# Patient Record
Sex: FEMALE | Race: WHITE | NOT HISPANIC OR LATINO | Employment: FULL TIME | URBAN - METROPOLITAN AREA
[De-identification: names, ages, dates, MRNs, and addresses within clinical notes are randomized per-mention and may not be internally consistent; named-entity substitution may affect disease eponyms.]

---

## 2024-05-17 ENCOUNTER — HOSPITAL ENCOUNTER (EMERGENCY)
Facility: HOSPITAL | Age: 34
Discharge: HOME | End: 2024-05-17
Payer: COMMERCIAL

## 2024-05-17 VITALS
SYSTOLIC BLOOD PRESSURE: 141 MMHG | HEIGHT: 64 IN | RESPIRATION RATE: 16 BRPM | WEIGHT: 253 LBS | BODY MASS INDEX: 43.19 KG/M2 | DIASTOLIC BLOOD PRESSURE: 85 MMHG | HEART RATE: 84 BPM | TEMPERATURE: 97.7 F | OXYGEN SATURATION: 99 %

## 2024-05-17 DIAGNOSIS — M54.31 SCIATICA OF RIGHT SIDE: Primary | ICD-10-CM

## 2024-05-17 PROCEDURE — 2500000005 HC RX 250 GENERAL PHARMACY W/O HCPCS: Performed by: PHYSICIAN ASSISTANT

## 2024-05-17 PROCEDURE — 2500000004 HC RX 250 GENERAL PHARMACY W/ HCPCS (ALT 636 FOR OP/ED): Performed by: PHYSICIAN ASSISTANT

## 2024-05-17 PROCEDURE — 96372 THER/PROPH/DIAG INJ SC/IM: CPT | Performed by: PHYSICIAN ASSISTANT

## 2024-05-17 PROCEDURE — 2500000001 HC RX 250 WO HCPCS SELF ADMINISTERED DRUGS (ALT 637 FOR MEDICARE OP): Performed by: PHYSICIAN ASSISTANT

## 2024-05-17 PROCEDURE — 99283 EMERGENCY DEPT VISIT LOW MDM: CPT

## 2024-05-17 RX ORDER — CYCLOBENZAPRINE HCL 10 MG
10 TABLET ORAL 3 TIMES DAILY PRN
Qty: 15 TABLET | Refills: 0 | Status: SHIPPED | OUTPATIENT
Start: 2024-05-17

## 2024-05-17 RX ORDER — LIDOCAINE 560 MG/1
1 PATCH PERCUTANEOUS; TOPICAL; TRANSDERMAL DAILY
Qty: 6 PATCH | Refills: 0 | Status: SHIPPED | OUTPATIENT
Start: 2024-05-17

## 2024-05-17 RX ORDER — HYDROCODONE BITARTRATE AND ACETAMINOPHEN 5; 325 MG/1; MG/1
1 TABLET ORAL ONCE
Status: COMPLETED | OUTPATIENT
Start: 2024-05-17 | End: 2024-05-17

## 2024-05-17 RX ORDER — LIDOCAINE 560 MG/1
1 PATCH PERCUTANEOUS; TOPICAL; TRANSDERMAL DAILY
Qty: 1 PATCH | Refills: 0 | Status: DISCONTINUED | OUTPATIENT
Start: 2024-05-17 | End: 2024-05-17 | Stop reason: HOSPADM

## 2024-05-17 RX ORDER — HYDROCODONE BITARTRATE AND ACETAMINOPHEN 5; 325 MG/1; MG/1
1 TABLET ORAL EVERY 6 HOURS PRN
Qty: 12 TABLET | Refills: 0 | Status: SHIPPED | OUTPATIENT
Start: 2024-05-17 | End: 2024-05-20

## 2024-05-17 RX ORDER — IBUPROFEN 600 MG/1
600 TABLET ORAL EVERY 6 HOURS PRN
Qty: 28 TABLET | Refills: 0 | Status: SHIPPED | OUTPATIENT
Start: 2024-05-17 | End: 2024-05-24

## 2024-05-17 RX ORDER — ORPHENADRINE CITRATE 30 MG/ML
60 INJECTION INTRAMUSCULAR; INTRAVENOUS ONCE
Status: COMPLETED | OUTPATIENT
Start: 2024-05-17 | End: 2024-05-17

## 2024-05-17 RX ORDER — KETOROLAC TROMETHAMINE 30 MG/ML
30 INJECTION, SOLUTION INTRAMUSCULAR; INTRAVENOUS ONCE
Status: COMPLETED | OUTPATIENT
Start: 2024-05-17 | End: 2024-05-17

## 2024-05-17 RX ADMIN — KETOROLAC TROMETHAMINE 30 MG: 30 INJECTION, SOLUTION INTRAMUSCULAR at 11:27

## 2024-05-17 RX ADMIN — LIDOCAINE 1 PATCH: 4 PATCH TOPICAL at 11:27

## 2024-05-17 RX ADMIN — HYDROCODONE BITARTRATE AND ACETAMINOPHEN 1 TABLET: 5; 325 TABLET ORAL at 11:26

## 2024-05-17 RX ADMIN — ORPHENADRINE CITRATE 60 MG: 60 INJECTION INTRAMUSCULAR; INTRAVENOUS at 11:27

## 2024-05-17 ASSESSMENT — PAIN DESCRIPTION - LOCATION: LOCATION: BACK

## 2024-05-17 ASSESSMENT — COLUMBIA-SUICIDE SEVERITY RATING SCALE - C-SSRS
1. IN THE PAST MONTH, HAVE YOU WISHED YOU WERE DEAD OR WISHED YOU COULD GO TO SLEEP AND NOT WAKE UP?: NO
2. HAVE YOU ACTUALLY HAD ANY THOUGHTS OF KILLING YOURSELF?: NO
6. HAVE YOU EVER DONE ANYTHING, STARTED TO DO ANYTHING, OR PREPARED TO DO ANYTHING TO END YOUR LIFE?: NO

## 2024-05-17 ASSESSMENT — PAIN DESCRIPTION - PAIN TYPE: TYPE: ACUTE PAIN

## 2024-05-17 ASSESSMENT — PAIN - FUNCTIONAL ASSESSMENT
PAIN_FUNCTIONAL_ASSESSMENT: 0-10
PAIN_FUNCTIONAL_ASSESSMENT: 0-10

## 2024-05-17 ASSESSMENT — PAIN SCALES - GENERAL
PAINLEVEL_OUTOF10: 8
PAINLEVEL_OUTOF10: 10 - WORST POSSIBLE PAIN

## 2024-05-18 NOTE — ED PROVIDER NOTES
HPI   Chief Complaint   Patient presents with    Back Pain       Pleasant 33-year-old female complains of pain in her back on the right SI region rating down her right leg history of low back pain and sciatica in the past states that exacerbates from time to time.  No specific injury.  No bowel bladder incontinence no saddle anesthesia or paresthesia.  Nothing makes her better worse with certain movements and walking.                          [place] Coma Scale Score: 15                     Patient History   History reviewed. No pertinent past medical history.  History reviewed. No pertinent surgical history.  No family history on file.  Social History     Tobacco Use    Smoking status: Not on file    Smokeless tobacco: Not on file   Substance Use Topics    Alcohol use: Not on file    Drug use: Not on file       Physical Exam   ED Triage Vitals   Temperature Heart Rate Respirations BP   05/17/24 1041 05/17/24 1041 05/17/24 1147 05/17/24 1041   36.5 °C (97.7 °F) 89 16 141/85      Pulse Ox Temp Source Heart Rate Source Patient Position   05/17/24 1041 05/17/24 1041 05/17/24 1041 05/17/24 1041   97 % Temporal Monitor Sitting      BP Location FiO2 (%)     05/17/24 1041 --     Left arm        Physical Exam  Vitals reviewed.   Constitutional:       General: She is not in acute distress.     Appearance: Normal appearance. She is normal weight. She is not ill-appearing, toxic-appearing or diaphoretic.   HENT:      Head: Normocephalic and atraumatic.      Right Ear: External ear normal.      Left Ear: External ear normal.      Nose: Nose normal.   Eyes:      Extraocular Movements: Extraocular movements intact.      Conjunctiva/sclera: Conjunctivae normal.      Pupils: Pupils are equal, round, and reactive to light.   Cardiovascular:      Rate and Rhythm: Normal rate.   Pulmonary:      Effort: Pulmonary effort is normal. No respiratory distress.      Breath sounds: No stridor.   Abdominal:      General: There is no  distension.   Musculoskeletal:         General: Tenderness present. No swelling or deformity.      Cervical back: Normal range of motion.      Comments: Tender in the right SI joint with positive straight leg raise tenderness down her right leg to the knee.  Gait limited due to pain.  No neurologic deficits.   Skin:     Capillary Refill: Capillary refill takes less than 2 seconds.      Findings: No rash.   Neurological:      General: No focal deficit present.      Mental Status: She is alert and oriented to person, place, and time. Mental status is at baseline.   Psychiatric:         Mood and Affect: Mood normal.         Behavior: Behavior normal.         Thought Content: Thought content normal.         Judgment: Judgment normal.         ED Course & MDM   Diagnoses as of 05/17/24 2015   Sciatica of right side       Medical Decision Making  Differential includes sciatica, lumbar radiculopathy, aortic dissection, cauda equina syndrome    No red flags of back pain we will treat her pain and encourage close follow-up with primary care referred to a new 1 since she is switched insurances.        Procedure  Procedures     Brendan Sabillon PA-C  05/17/24 2017

## 2024-05-31 ENCOUNTER — APPOINTMENT (OUTPATIENT)
Dept: PRIMARY CARE | Facility: CLINIC | Age: 34
End: 2024-05-31
Payer: COMMERCIAL

## 2024-10-30 PROCEDURE — RXMED WILLOW AMBULATORY MEDICATION CHARGE

## 2024-10-31 ENCOUNTER — PHARMACY VISIT (OUTPATIENT)
Dept: PHARMACY | Facility: CLINIC | Age: 34
End: 2024-10-31
Payer: COMMERCIAL

## 2024-12-26 PROCEDURE — RXMED WILLOW AMBULATORY MEDICATION CHARGE

## 2025-01-03 PROCEDURE — RXMED WILLOW AMBULATORY MEDICATION CHARGE

## 2025-01-04 ENCOUNTER — PHARMACY VISIT (OUTPATIENT)
Dept: PHARMACY | Facility: CLINIC | Age: 35
End: 2025-01-04
Payer: COMMERCIAL

## 2025-01-28 PROCEDURE — RXMED WILLOW AMBULATORY MEDICATION CHARGE

## 2025-01-30 PROCEDURE — RXMED WILLOW AMBULATORY MEDICATION CHARGE

## 2025-01-31 ENCOUNTER — PHARMACY VISIT (OUTPATIENT)
Dept: PHARMACY | Facility: CLINIC | Age: 35
End: 2025-01-31
Payer: COMMERCIAL

## 2025-01-31 ENCOUNTER — HOSPITAL ENCOUNTER (EMERGENCY)
Facility: HOSPITAL | Age: 35
Discharge: HOME | End: 2025-01-31
Attending: EMERGENCY MEDICINE
Payer: COMMERCIAL

## 2025-01-31 ENCOUNTER — APPOINTMENT (OUTPATIENT)
Dept: RADIOLOGY | Facility: HOSPITAL | Age: 35
End: 2025-01-31
Payer: COMMERCIAL

## 2025-01-31 VITALS
HEART RATE: 89 BPM | BODY MASS INDEX: 30.73 KG/M2 | DIASTOLIC BLOOD PRESSURE: 78 MMHG | RESPIRATION RATE: 18 BRPM | OXYGEN SATURATION: 98 % | WEIGHT: 180 LBS | TEMPERATURE: 97.6 F | SYSTOLIC BLOOD PRESSURE: 122 MMHG | HEIGHT: 64 IN

## 2025-01-31 DIAGNOSIS — M79.604 RIGHT LEG PAIN: Primary | ICD-10-CM

## 2025-01-31 DIAGNOSIS — M54.31 SCIATICA OF RIGHT SIDE: ICD-10-CM

## 2025-01-31 PROCEDURE — 99284 EMERGENCY DEPT VISIT MOD MDM: CPT | Performed by: EMERGENCY MEDICINE

## 2025-01-31 PROCEDURE — 73552 X-RAY EXAM OF FEMUR 2/>: CPT | Mod: RT

## 2025-01-31 PROCEDURE — 2500000004 HC RX 250 GENERAL PHARMACY W/ HCPCS (ALT 636 FOR OP/ED): Performed by: SURGERY

## 2025-01-31 PROCEDURE — 73590 X-RAY EXAM OF LOWER LEG: CPT | Mod: RIGHT SIDE | Performed by: RADIOLOGY

## 2025-01-31 PROCEDURE — 2500000001 HC RX 250 WO HCPCS SELF ADMINISTERED DRUGS (ALT 637 FOR MEDICARE OP): Performed by: SURGERY

## 2025-01-31 PROCEDURE — 96372 THER/PROPH/DIAG INJ SC/IM: CPT | Performed by: SURGERY

## 2025-01-31 PROCEDURE — 73590 X-RAY EXAM OF LOWER LEG: CPT | Mod: RT

## 2025-01-31 PROCEDURE — 73552 X-RAY EXAM OF FEMUR 2/>: CPT | Mod: RIGHT SIDE | Performed by: RADIOLOGY

## 2025-01-31 PROCEDURE — 2500000004 HC RX 250 GENERAL PHARMACY W/ HCPCS (ALT 636 FOR OP/ED): Mod: JZ | Performed by: EMERGENCY MEDICINE

## 2025-01-31 PROCEDURE — 96372 THER/PROPH/DIAG INJ SC/IM: CPT | Performed by: EMERGENCY MEDICINE

## 2025-01-31 RX ORDER — HYDROMORPHONE HYDROCHLORIDE 1 MG/ML
1 INJECTION, SOLUTION INTRAMUSCULAR; INTRAVENOUS; SUBCUTANEOUS ONCE
Status: COMPLETED | OUTPATIENT
Start: 2025-01-31 | End: 2025-01-31

## 2025-01-31 RX ORDER — CYCLOBENZAPRINE HCL 10 MG
10 TABLET ORAL ONCE
Status: COMPLETED | OUTPATIENT
Start: 2025-01-31 | End: 2025-01-31

## 2025-01-31 RX ORDER — KETOROLAC TROMETHAMINE 30 MG/ML
30 INJECTION, SOLUTION INTRAMUSCULAR; INTRAVENOUS ONCE
Status: COMPLETED | OUTPATIENT
Start: 2025-01-31 | End: 2025-01-31

## 2025-01-31 RX ORDER — ACETAMINOPHEN 325 MG/1
650 TABLET ORAL ONCE
Status: COMPLETED | OUTPATIENT
Start: 2025-01-31 | End: 2025-01-31

## 2025-01-31 RX ORDER — METHYLPREDNISOLONE 4 MG/1
TABLET ORAL
Qty: 21 TABLET | Refills: 0 | Status: SHIPPED | OUTPATIENT
Start: 2025-01-31 | End: 2025-02-06

## 2025-01-31 RX ORDER — GABAPENTIN 300 MG/1
300 CAPSULE ORAL ONCE
Status: COMPLETED | OUTPATIENT
Start: 2025-01-31 | End: 2025-01-31

## 2025-01-31 RX ORDER — PREDNISONE 20 MG/1
60 TABLET ORAL ONCE
Status: COMPLETED | OUTPATIENT
Start: 2025-01-31 | End: 2025-01-31

## 2025-01-31 RX ADMIN — KETOROLAC TROMETHAMINE 30 MG: 30 INJECTION, SOLUTION INTRAMUSCULAR at 04:17

## 2025-01-31 RX ADMIN — PREDNISONE 60 MG: 20 TABLET ORAL at 04:17

## 2025-01-31 RX ADMIN — ACETAMINOPHEN 650 MG: 325 TABLET, FILM COATED ORAL at 06:16

## 2025-01-31 RX ADMIN — GABAPENTIN 300 MG: 300 CAPSULE ORAL at 06:16

## 2025-01-31 RX ADMIN — CYCLOBENZAPRINE 10 MG: 10 TABLET, FILM COATED ORAL at 04:17

## 2025-01-31 RX ADMIN — HYDROMORPHONE HYDROCHLORIDE 1 MG: 1 INJECTION, SOLUTION INTRAMUSCULAR; INTRAVENOUS; SUBCUTANEOUS at 06:32

## 2025-01-31 ASSESSMENT — PAIN SCALES - GENERAL
PAINLEVEL_OUTOF10: 8
PAINLEVEL_OUTOF10: 8
PAINLEVEL_OUTOF10: 7
PAINLEVEL_OUTOF10: 8
PAINLEVEL_OUTOF10: 8

## 2025-01-31 ASSESSMENT — PAIN DESCRIPTION - PAIN TYPE
TYPE: ACUTE PAIN
TYPE: ACUTE PAIN

## 2025-01-31 ASSESSMENT — PAIN DESCRIPTION - ORIENTATION
ORIENTATION: RIGHT
ORIENTATION: RIGHT

## 2025-01-31 ASSESSMENT — PAIN DESCRIPTION - LOCATION
LOCATION: LEG
LOCATION: LEG

## 2025-01-31 ASSESSMENT — COLUMBIA-SUICIDE SEVERITY RATING SCALE - C-SSRS
2. HAVE YOU ACTUALLY HAD ANY THOUGHTS OF KILLING YOURSELF?: NO
1. IN THE PAST MONTH, HAVE YOU WISHED YOU WERE DEAD OR WISHED YOU COULD GO TO SLEEP AND NOT WAKE UP?: NO
6. HAVE YOU EVER DONE ANYTHING, STARTED TO DO ANYTHING, OR PREPARED TO DO ANYTHING TO END YOUR LIFE?: NO

## 2025-01-31 ASSESSMENT — PAIN - FUNCTIONAL ASSESSMENT
PAIN_FUNCTIONAL_ASSESSMENT: 0-10
PAIN_FUNCTIONAL_ASSESSMENT: 0-10

## 2025-01-31 NOTE — Clinical Note
Sofia Foreman was seen and treated in our emergency department on 1/31/2025.  She may return to work on 02/03/2025.       If you have any questions or concerns, please don't hesitate to call.      David Briceno PA-C

## 2025-01-31 NOTE — PROGRESS NOTES
Emergency Medicine Transition of Care Note.    I received Sofia Foreman in signout from Dr. Roy.  Please see the previous ED provider note for all HPI, PE and MDM up to the time of signout at 6:00. This is in addition to the primary record.    In brief Sofia Foreman is an 34 y.o. female presenting for   Chief Complaint   Patient presents with    Leg Pain     At the time of signout we were awaiting: Response to pain meds.  Patient asked to be reevaluated by somebody else because she was concerned that her pain was not controlled.  I reevaluated the patient and she does have numbness along the L5 distribution so I am concerned for a sciatica with nerve impingement.  She has already received prednisone as well as Flexeril and Tylenol.  I added an additional dose of her 300 mg gabapentin I gave her 1 mg of IV Dilaudid.  Patient's pain is much improved now.  I communicated with Dr. Hein and Dr. Hein will get her scheduled for a pain injection is soon as possible.  Patient already has a prescription for prednisone waiting for her that was prescribed yesterday.  I instructed her to take this as well as increase her gabapentin to 3 times a day and add an NSAID every 12 hours.  Patient is frustrated that I am not prescribing her an opiate.  Patient is on numerous sedating medications including benzos and opiates have not been shown to control back pain, in addition there are multiple contraindications with mixing benzos and opiates so I am not prescribing her an opiate.  Patient is requesting to speak to the [facility] and I have provided her with patient advocate's number.    Diagnoses as of 01/31/25 0959   Right leg pain   Sciatica of right side       Medical Decision Making  Risk  Risk Details: Discussed with Dr. Hein again.  Our plan is to see if patient has relief over the weekend with prednisone and increasing her Neurontin.  If she has intolerable pain on Monday she can be admitted to [place]  through the [facility] emergency department for a pain procedure.        Final diagnoses:   [M79.604] Right leg pain   [M54.31] Sciatica of right side           Procedure  Procedures    Diana Perdomo MD

## 2025-01-31 NOTE — ED PROVIDER NOTES
Chief Complaint   Patient presents with    Leg Pain     HPI:   Sofia Foreman is an 34 y.o. female with a history of LEONARDO, HLD, obesity presenting to the ED for chief complaint of right leg pain.  She explains her symptoms began yesterday and she experienced pain in her right gluteal muscle that has been progressively worsening.  Explains that the pain radiates down her entire leg and feels like an dull aching spasm.  She explains she has numbness on the lateral aspect of her right leg and bottom of her foot.  She did call her primary care doctor today who sent her in a prescription for baclofen and steroids which she did not have time to  today.  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  Patient has increased pain with ambulation and certain movements.  She is taken ibuprofen with no relief.  Denies fever chills or sweats.  No abdominal pain NVD.  No chest pain or shortness of breath.      Allergies   Allergen Reactions    Penicillins Anaphylaxis    Mushroom Rash   :  No past medical history on file.  No past surgical history on file.  No family history on file.     Physical Exam  Vitals and nursing note reviewed.   Constitutional:       General: She is not in acute distress.     Appearance: She is well-developed.   HENT:      Head: Normocephalic and atraumatic.      Right Ear: Tympanic membrane normal.      Left Ear: Tympanic membrane normal.      Nose: Nose normal.      Mouth/Throat:      Mouth: Mucous membranes are moist.      Pharynx: Oropharynx is clear.   Eyes:      Extraocular Movements: Extraocular movements intact.      Conjunctiva/sclera: Conjunctivae normal.      Pupils: Pupils are equal, round, and reactive to light.   Cardiovascular:      Rate and Rhythm: Normal rate and regular rhythm.      Heart sounds: No murmur heard.  Pulmonary:      Effort: Pulmonary effort is normal. No respiratory distress.      Breath sounds: Normal breath sounds.   Abdominal:       Palpations: Abdomen is soft.      Tenderness: There is no abdominal tenderness.   Musculoskeletal:         General: No swelling, tenderness, deformity or signs of injury. Normal range of motion.      Cervical back: Neck supple.      Right lower leg: No edema.      Left lower leg: No edema.   Skin:     General: Skin is warm and dry.      Capillary Refill: Capillary refill takes less than 2 seconds.   Neurological:      General: No focal deficit present.      Mental Status: She is alert and oriented to person, place, and time.      Motor: Motor function is intact. No atrophy.   Psychiatric:         Mood and Affect: Mood normal.        VS: As documented in the triage note and EMR flowsheet from this visit were reviewed.    Medical Decision Making: This is a 34-year-old female presenting to the ED for complaint of right leg pain.  Her pain is aching spasm.  Her pain does not follow any specific distribution or character.  My exam shows full range of motion of the joints in the lower extremity.  Her pain is not reproducible on exam.  She is able to ambulate with an antalgic gait.  Her sensation was preserved on the right lower extremity.  She did have decreased sensation on the bottom of the foot.  She had strength in flexion and extension in all joints.  She had no muscle wasting.  Good distal pulses good cap refill.  Full range of motion at the hip knee and ankle no suspicion for cord compression or septic joint.  She does take hormones however there is no swelling erythema negative Homans' sign so duplex ultrasound was not ordered.  Low suspicion of DVT.  She was treated in the ED with 60 mg of prednisone, 30 mg of Toradol, 300 mg of gabapentin, 10 mg of Flexeril and 650 mg of Tylenol.  She was given a referral to pain medicine.  Patient was discharged for home-going management.  Patient was very angry upon discharge requesting admission and requesting MRI be performed.  Discussed with her in detail her symptoms do  not fit criteria for emergency MRI.  She was able to walk and did drive to the ED so narcotics were not administered.  She was signed out to incoming Dr. Perdomo for discharge after discussion with the nursing supervisor and ross.  Diagnoses as of 01/31/25 0559   Right leg pain   Sciatica of right side     Counseling: Spoke with the patient and discussed today´s findings, in addition to providing specific details for the plan of care and expected course.  Patient was given the opportunity to ask questions.    Discussed return precautions and importance of follow-up.  Advised to follow-up with pain medicine.  I specifically advised to return to the ED for changing or worsening symptoms, new symptoms, complaint specific precautions, and precautions listed on the discharge paperwork.  Educated on the common potential side effects of medications prescribed.    I advised the patient that the emergency evaluation and treatment provided today doesn't end their need for medical care. It is very important that they follow-up with their primary care provider or other specialist as instructed.    The plan of care was mutually agreed upon with the patient. The patient and/or family were given the opportunity to ask questions. All questions asked today in the ED were answered to the best of my ability with today's information.    This report was transcribed using voice recognition software.  Every effort was made to ensure accuracy, however, inadvertently computerized transcription errors may be present.       David Briceno PA-C  01/31/25 2020

## 2025-01-31 NOTE — DISCHARGE INSTRUCTIONS
Dr. Hein's office will follow up with you to schedule a pain injection.  To manage your pain until you see her:   Increase your gabapentin to 300 mg three times per day   Continue the steroid pack- start this tomorrow because you already had your first dose in the ED   Add aleve 1 pill every 12 hours   Continue your regular medications including clonazepam, topamax and trazodone    PT [facility]:  [phone]

## 2025-01-31 NOTE — ED TRIAGE NOTES
Pt from private vehicle c/c of Right leg pain and numbness starting mid shin to foot. Denies any injury.     Pt A&Ox3, pt alert calm cooperative with care. Pt resp even and unlabored.     PMH: n/a

## 2025-02-06 ENCOUNTER — HOSPITAL ENCOUNTER (OUTPATIENT)
Dept: RADIOLOGY | Facility: CLINIC | Age: 35
Discharge: HOME | End: 2025-02-06
Payer: COMMERCIAL

## 2025-02-06 DIAGNOSIS — M54.50 LOW BACK PAIN, UNSPECIFIED: ICD-10-CM

## 2025-02-06 DIAGNOSIS — R53.1 WEAKNESS: ICD-10-CM

## 2025-02-06 DIAGNOSIS — R20.2 PARESTHESIA OF SKIN: ICD-10-CM

## 2025-02-06 PROCEDURE — 72148 MRI LUMBAR SPINE W/O DYE: CPT

## 2025-02-06 PROCEDURE — 72141 MRI NECK SPINE W/O DYE: CPT

## 2025-02-12 ENCOUNTER — APPOINTMENT (OUTPATIENT)
Facility: HOSPITAL | Age: 35
End: 2025-02-12
Payer: COMMERCIAL

## 2025-02-18 ENCOUNTER — APPOINTMENT (OUTPATIENT)
Dept: PAIN MEDICINE | Facility: HOSPITAL | Age: 35
End: 2025-02-18
Payer: COMMERCIAL

## 2025-02-24 ENCOUNTER — APPOINTMENT (OUTPATIENT)
Dept: ORTHOPEDIC SURGERY | Facility: CLINIC | Age: 35
End: 2025-02-24
Payer: COMMERCIAL

## 2025-02-24 ENCOUNTER — HOSPITAL ENCOUNTER (OUTPATIENT)
Dept: RADIOLOGY | Facility: CLINIC | Age: 35
Discharge: HOME | End: 2025-02-24
Payer: COMMERCIAL

## 2025-02-24 DIAGNOSIS — M54.31 SCIATICA OF RIGHT SIDE: ICD-10-CM

## 2025-02-24 PROBLEM — M51.26 OTHER INTERVERTEBRAL DISC DISPLACEMENT, LUMBAR REGION: Status: ACTIVE | Noted: 2025-02-24

## 2025-02-24 PROCEDURE — 72100 X-RAY EXAM L-S SPINE 2/3 VWS: CPT

## 2025-02-24 PROCEDURE — 99204 OFFICE O/P NEW MOD 45 MIN: CPT | Performed by: ORTHOPAEDIC SURGERY

## 2025-02-24 PROCEDURE — 72100 X-RAY EXAM L-S SPINE 2/3 VWS: CPT | Performed by: RADIOLOGY

## 2025-02-24 ASSESSMENT — PAIN - FUNCTIONAL ASSESSMENT: PAIN_FUNCTIONAL_ASSESSMENT: 0-10

## 2025-02-24 ASSESSMENT — PAIN SCALES - GENERAL: PAINLEVEL_OUTOF10: 8

## 2025-02-24 NOTE — LETTER
February 24, 2025     David Briceno PA-C  [address]  [facility] [address]    Patient: Sofia Foreman   YOB: 1990   Date of Visit: 2/24/2025       Dear Dr. David Briceno PA-C:    Thank you for referring Sofia Foreman to me for evaluation. Below are my notes for this consultation.  If you have questions, please do not hesitate to call me. I look forward to following your patient along with you.       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    8 weeks ago, she developed the abrupt onset of severe right sciatica with pain and weakness.    She has had periodic episodes of low back in the past.  No prior radicular symptoms.    She was seen in the [facility] emergency department.  Treated with analgesics and steroids.  She has been using nonsteroidals.    She is having great difficulty standing and walking.  It has been very difficult for her to work.    No left leg symptoms.    She does have a strong family history of congenital stenosis with siblings and parent having prior lumbar surgery.    She has had and elective 80 pound weight loss over the last year with medication and exercise.    Family, social, and medical histories are obtained and reviewed.    30-point, patient-recorded Review of Systems is personally obtained and reviewed. Inclusive is no history of weight loss, change in appetite, recent change in activity level, change in bowel or bladder habits, fevers, chills, malaise, or night pain.    She is here with her mother    She does not smoke.    On exam she is a pleasant young woman in significant distress secondary to right leg pain and weakness.  She has a very slow antalgic gait secondary to right leg pain.    Painless motion both hips.    Her strength is intact in the left lower  extremity.    On the right she has weakness in knee extension ankle dorsiflexion both 3/5 great toe dorsiflexion 2/5.  No hyperreflexia.    Flexion-extension views today show maintenance of alignment without instability.  Short pedicles consistent with congenital stenosis.    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  Her symptoms have been present for 8 weeks with no improvement.    Given the duration and persistence of her symptoms along with the objective weakness, surgery is indicated in the form of a partial discectomy.  I suspect this can be addressed as a microdiscectomy.  With the congenital narrowing, there would be the potential need for a laminectomy.    We have discussed the risks and benefits and expectations of surgery.    She would like to proceed.    Tentatively, I would plan her to be off of work for 8 weeks following surgery, given the magnitude of her pain and the magnitude of her preoperative weakness.    Surgery would be and L4-5 microdiscectomy.    ** Dictated with voice recognition software and not immediately reviewed for errors in grammar and/or spelling **

## 2025-02-24 NOTE — PROGRESS NOTES
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    8 weeks ago, she developed the abrupt onset of severe right sciatica with pain and weakness.    She has had periodic episodes of low back in the past.  No prior radicular symptoms.    She was seen in the [facility] emergency department.  Treated with analgesics and steroids.  She has been using nonsteroidals.    She is having great difficulty standing and walking.  It has been very difficult for her to work.    No left leg symptoms.    She does have a strong family history of congenital stenosis with siblings and parent having prior lumbar surgery.    She has had and elective 80 pound weight loss over the last year with medication and exercise.    Family, social, and medical histories are obtained and reviewed.    30-point, patient-recorded Review of Systems is personally obtained and reviewed. Inclusive is no history of weight loss, change in appetite, recent change in activity level, change in bowel or bladder habits, fevers, chills, malaise, or night pain.    She is here with her mother    She does not smoke.    On exam she is a pleasant young woman in significant distress secondary to right leg pain and weakness.  She has a very slow antalgic gait secondary to right leg pain.    Painless motion both hips.    Her strength is intact in the left lower extremity.    On the right she has weakness in knee extension ankle dorsiflexion both 3/5 great toe dorsiflexion 2/5.  No hyperreflexia.    Flexion-extension views today show maintenance of alignment without instability.  Short pedicles consistent with congenital stenosis.    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  Her symptoms have been present for 8 weeks with no improvement.    Given the duration and  persistence of her symptoms along with the objective weakness, surgery is indicated in the form of a partial discectomy.  I suspect this can be addressed as a microdiscectomy.  With the congenital narrowing, there would be the potential need for a laminectomy.    We have discussed the risks and benefits and expectations of surgery.    She would like to proceed.    Tentatively, I would plan her to be off of work for 8 weeks following surgery, given the magnitude of her pain and the magnitude of her preoperative weakness.    Surgery would be and L4-5 microdiscectomy.    ** Dictated with voice recognition software and not immediately reviewed for errors in grammar and/or spelling **

## 2025-02-27 ENCOUNTER — TELEPHONE (OUTPATIENT)
Dept: ORTHOPEDIC SURGERY | Facility: HOSPITAL | Age: 35
End: 2025-02-27
Payer: COMMERCIAL

## 2025-02-27 ENCOUNTER — CLINICAL SUPPORT (OUTPATIENT)
Dept: PREADMISSION TESTING | Facility: HOSPITAL | Age: 35
End: 2025-02-27
Payer: COMMERCIAL

## 2025-02-27 DIAGNOSIS — M51.26 OTHER INTERVERTEBRAL DISC DISPLACEMENT, LUMBAR REGION: ICD-10-CM

## 2025-02-27 RX ORDER — OXYCODONE HYDROCHLORIDE 5 MG/1
5 TABLET ORAL EVERY 4 HOURS PRN
COMMUNITY
Start: 2025-02-08

## 2025-02-27 RX ORDER — OMEPRAZOLE 20 MG/1
20 TABLET, DELAYED RELEASE ORAL
COMMUNITY
Start: 2023-10-03

## 2025-02-27 RX ORDER — ACETAMINOPHEN, ASPIRIN (NSAID), AND CAFFEINE 250; 250; 65 MG/1; MG/1; MG/1
1 TABLET, FILM COATED ORAL EVERY 6 HOURS PRN
COMMUNITY

## 2025-02-27 NOTE — TELEPHONE ENCOUNTER
I spoke with Sofia to explain the [facility]) program and program criteria (including but not limited to: BMI less than 40, Hemoglobin A1C less than 8, and non-nicotine use). Patient meets ROWAN program criteria and wishes to proceed with the enhanced ROWAN benefit.     Temporary  Employee ROWAN ID card sent via [employer] mail and Epic MyChart- instructed to use this in place of standard insurance card and advised to present this when checking in for preadmission testing/ CPM appointment, on the date of surgery, and at the first post-operative appointment.     As part of the  Employee [facility] enhanced benefit, Sofia will receive a total of 5 home care visits. This will include one nursing visit, three physical therapy visits and one occupational therapy visit. Validated patient is within the [facility] network. Sofia has stairs in the home, approximately 10; she has a bathroom on each level of the house. Her  and mom will be assisting Sofia in the immediate post-operative period. She has been furniture walking. A front wheeled walker will be issued to her from our  DME department for safe ambulation.    Patient was provided my phone number as I will be a point of contact going forward. I will be in touch with Sofia as the surgery date approaches.  Jennifer Seay RN

## 2025-02-27 NOTE — CPM/PAT NURSE NOTE
CPM/PAT Nurse Note      Name: Sofia Foreman (Sofia Foreman)  /Age: 1990/34 y.o.       Past Medical History:   Diagnosis Date    Anemia     iron deficiency 13.4/41.5 H&H 2023    Anxiety     HL (hearing loss)     bilateral 35% loss, multiple tympanoplastys    Migraines        Past Surgical History:   Procedure Laterality Date    ANKLE FRACTURE SURGERY Left     HARDWARE REMOVAL Left 10/05/2022    TYMPANOPLASTY      multiple       Patient Sexual activity questions deferred to the physician.    No family history on file.    Allergies   Allergen Reactions    Penicillins Anaphylaxis    Mushroom Rash       Prior to Admission medications    Medication Sig Start Date End Date Taking? Authorizing Provider   omeprazole OTC (PriLOSEC OTC) 20 mg EC tablet Take 1 tablet (20 mg) by mouth once daily. 10/3/23  Yes Historical Provider, MD   oxyCODONE (Roxicodone) 5 mg immediate release tablet Take 1 tablet (5 mg) by mouth every 4 hours if needed. 25  Yes Historical Provider, MD   aspirin-acetaminophen-caffeine (Excedrin Extra Strength) 250-250-65 mg tablet Take 1 tablet by mouth every 6 hours if needed.    Historical Provider, MD   baclofen (Lioresal) 10 mg tablet Take 1 tablet (10 mg) by mouth 2 times a day. 25      buPROPion SR (Wellbutrin SR) 200 mg 12 hr tablet Take 1 tablet (200 mg) by mouth once daily. 1/3/25      clonazePAM (KlonoPIN) 1 mg tablet Take 1 tablet (1 mg) by mouth once daily as needed. 10/30/24      cyclobenzaprine (Flexeril) 10 mg tablet Take 1 tablet (10 mg) by mouth 3 times a day as needed for muscle spasms.  Patient not taking: Reported on 2025   Brendan Sabillon PA-C   gabapentin (Neurontin) 300 mg capsule Take 1 capsule (300 mg) by mouth once daily. 10/30/24      lidocaine 4 % patch Place 1 patch over 12 hours on the skin once daily. Remove & discard patch within 12 hours or as directed by MD. 24   Brendan Sabillon PA-C   meloxicam (Mobic) 15 mg tablet Take 1 tablet (15 mg)  by mouth once daily. 10/30/24      norethindrone-e.estradioL-iron (Loestrin 24 FE) 1 mg-20 mcg (24)/75 mg (4) tablet Take 1 tablet by mouth once daily. Take continuously (skip placebos) 1/3/25      sertraline (Zoloft) 100 mg tablet Take 1 and 1/2 tablets by mouth daily 1/3/25      topiramate (Topamax) 25 mg tablet Take 1 tablet by mouth every morning and 2 tablets by mouth every evening 1/3/25      traZODone (Desyrel) 100 mg tablet Take 1 tablet (100 mg) by mouth once daily at bedtime. 1/3/25           PAT ROS     DASI Risk Score      Flowsheet Row Questionnaire Series Submission from 2/26/2025 in [facility] with Generic Provider Chang   Can you take care of yourself (eat, dress, bathe, or use toilet)?  2.75  filed at 02/26/2025 0859   Can you walk indoors, such as around your house? 1.75  filed at 02/26/2025 0859   Can you walk a block or two on level ground?  2.75  filed at 02/26/2025 0859   Can you climb a flight of stairs or walk up a hill? 0  filed at 02/26/2025 0859   Can you run a short distance? 0  filed at 02/26/2025 0859   Can you do light work around the house like dusting or washing dishes? 2.7  filed at 02/26/2025 0859   Can you do moderate work around the house like vacuuming, sweeping floors or carrying groceries? 0  filed at 02/26/2025 0859   Can you do heavy work around the house like scrubbing floors or lifting and moving heavy furniture?  0  filed at 02/26/2025 0859   Can you do yard work like raking leaves, weeding or pushing a mower? 0  filed at 02/26/2025 0859   Can you have sexual relations? 0  filed at 02/26/2025 0859   Can you participate in moderate recreational activities like golf, bowling, dancing, doubles tennis or throwing a baseball or football? 0  filed at 02/26/2025 0859   Can you participate in strenous sports like swimming, singles tennis, football, basketball, or skiing? 0  filed at 02/26/2025 0859   DASI SCORE 9.95  filed at 02/26/2025 0859   METS Score (Will be calculated  only when all the questions are answered) 4  filed at 02/26/2025 0859          Caprini DVT Assessment    No data to display       Modified Frailty Index    No data to display       CHADS2 Stroke Risk  Current as of about an hour ago        N/A 3 to 100%: High Risk   2 to < 3%: Medium Risk   0 to < 2%: Low Risk     Last Change: N/A          This score determines the patient's risk of having a stroke if the patient has atrial fibrillation.        This score is not applicable to this patient. Components are not calculated.          Revised Cardiac Risk Index    No data to display       Apfel Simplified Score    No data to display       Risk Analysis Index Results This Encounter    No data found in the last 10 encounters.       Stop Bang Score      Flowsheet Row Questionnaire Series Submission from 2/26/2025 in [facility] with Generic Provider Chang   Do you snore loudly? 0  filed at 02/26/2025 0859   Do you often feel tired or fatigued after your sleep? 0  filed at 02/26/2025 0859   Has anyone ever observed you stop breathing in your sleep? 0  filed at 02/26/2025 0859   Do you have or are you being treated for high blood pressure? 0  filed at 02/26/2025 0859   Recent BMI (Calculated) 30.9  filed at 02/26/2025 0859   Is BMI greater than 35 kg/m2? 0=No  filed at 02/26/2025 0859   Age older than 50 years old? 0=No  filed at 02/26/2025 0859   Gender - Male 0=No  filed at 02/26/2025 0859          Prodigy: High Risk  Total Score: 3              Prodigy Previous Opioid Use Score           ARISCAT Score for Postoperative Pulmonary Complications    No data to display       Singh Perioperative Risk for Myocardial Infarction or Cardiac Arrest (COCO)    No data to display         Nurse Plan of Action:   RN screening call complete.  Reviewed allergies, medications and pharmacy, medical, surgical and social history with patient.  Chart updated.  Instructed patient to stop motrin prior to surgery.

## 2025-03-04 ENCOUNTER — CANCELED (OUTPATIENT)
Facility: CLINIC | Age: 35
End: 2025-03-04
Payer: COMMERCIAL

## 2025-03-04 ENCOUNTER — PRE-ADMISSION TESTING (OUTPATIENT)
Dept: PREADMISSION TESTING | Facility: HOSPITAL | Age: 35
End: 2025-03-04
Payer: COMMERCIAL

## 2025-03-04 ENCOUNTER — LAB (OUTPATIENT)
Dept: LAB | Facility: HOSPITAL | Age: 35
End: 2025-03-04
Payer: COMMERCIAL

## 2025-03-04 VITALS
TEMPERATURE: 97.7 F | HEIGHT: 64 IN | OXYGEN SATURATION: 96 % | DIASTOLIC BLOOD PRESSURE: 68 MMHG | SYSTOLIC BLOOD PRESSURE: 107 MMHG | BODY MASS INDEX: 30.49 KG/M2 | HEART RATE: 90 BPM | WEIGHT: 178.57 LBS | RESPIRATION RATE: 16 BRPM

## 2025-03-04 DIAGNOSIS — Z01.818 PREOP TESTING: Primary | ICD-10-CM

## 2025-03-04 DIAGNOSIS — Z01.818 ENCOUNTER FOR OTHER PREPROCEDURAL EXAMINATION: Primary | ICD-10-CM

## 2025-03-04 LAB
ANION GAP SERPL CALC-SCNC: 12 MMOL/L (ref 10–20)
APPEARANCE UR: ABNORMAL
APTT PPP: 30 SECONDS (ref 26–36)
BACTERIA #/AREA URNS AUTO: ABNORMAL /HPF
BASOPHILS # BLD AUTO: 0.04 X10*3/UL (ref 0–0.1)
BASOPHILS NFR BLD AUTO: 0.5 %
BILIRUB UR STRIP.AUTO-MCNC: NEGATIVE MG/DL
BUN SERPL-MCNC: 17 MG/DL (ref 6–23)
CALCIUM SERPL-MCNC: 9.3 MG/DL (ref 8.6–10.3)
CHLORIDE SERPL-SCNC: 110 MMOL/L (ref 98–107)
CO2 SERPL-SCNC: 24 MMOL/L (ref 21–32)
COLOR UR: YELLOW
CREAT SERPL-MCNC: 0.86 MG/DL (ref 0.5–1.05)
EGFRCR SERPLBLD CKD-EPI 2021: >90 ML/MIN/1.73M*2
EOSINOPHIL # BLD AUTO: 0.09 X10*3/UL (ref 0–0.7)
EOSINOPHIL NFR BLD AUTO: 1 %
ERYTHROCYTE [DISTWIDTH] IN BLOOD BY AUTOMATED COUNT: 13.1 % (ref 11.5–14.5)
EST. AVERAGE GLUCOSE BLD GHB EST-MCNC: 91 MG/DL
GLUCOSE SERPL-MCNC: 95 MG/DL (ref 74–99)
GLUCOSE UR STRIP.AUTO-MCNC: NORMAL MG/DL
HBA1C MFR BLD: 4.8 %
HCT VFR BLD AUTO: 45 % (ref 36–46)
HGB BLD-MCNC: 14.8 G/DL (ref 12–16)
IMM GRANULOCYTES # BLD AUTO: 0.02 X10*3/UL (ref 0–0.7)
IMM GRANULOCYTES NFR BLD AUTO: 0.2 % (ref 0–0.9)
INR PPP: 1.3 (ref 0.9–1.1)
KETONES UR STRIP.AUTO-MCNC: NEGATIVE MG/DL
LEUKOCYTE ESTERASE UR QL STRIP.AUTO: ABNORMAL
LYMPHOCYTES # BLD AUTO: 2.28 X10*3/UL (ref 1.2–4.8)
LYMPHOCYTES NFR BLD AUTO: 26 %
MCH RBC QN AUTO: 28.1 PG (ref 26–34)
MCHC RBC AUTO-ENTMCNC: 32.9 G/DL (ref 32–36)
MCV RBC AUTO: 85 FL (ref 80–100)
MONOCYTES # BLD AUTO: 0.51 X10*3/UL (ref 0.1–1)
MONOCYTES NFR BLD AUTO: 5.8 %
MUCOUS THREADS #/AREA URNS AUTO: ABNORMAL /LPF
NEUTROPHILS # BLD AUTO: 5.83 X10*3/UL (ref 1.2–7.7)
NEUTROPHILS NFR BLD AUTO: 66.5 %
NITRITE UR QL STRIP.AUTO: NEGATIVE
NRBC BLD-RTO: 0 /100 WBCS (ref 0–0)
PH UR STRIP.AUTO: 6 [PH]
PLATELET # BLD AUTO: 321 X10*3/UL (ref 150–450)
POTASSIUM SERPL-SCNC: 4.4 MMOL/L (ref 3.5–5.3)
PROT UR STRIP.AUTO-MCNC: ABNORMAL MG/DL
PROTHROMBIN TIME: 14.4 SECONDS (ref 9.8–12.4)
RBC # BLD AUTO: 5.27 X10*6/UL (ref 4–5.2)
RBC # UR STRIP.AUTO: ABNORMAL MG/DL
RBC #/AREA URNS AUTO: ABNORMAL /HPF
SODIUM SERPL-SCNC: 142 MMOL/L (ref 136–145)
SP GR UR STRIP.AUTO: 1.03
SQUAMOUS #/AREA URNS AUTO: ABNORMAL /HPF
UROBILINOGEN UR STRIP.AUTO-MCNC: NORMAL MG/DL
WBC # BLD AUTO: 8.8 X10*3/UL (ref 4.4–11.3)
WBC #/AREA URNS AUTO: ABNORMAL /HPF

## 2025-03-04 PROCEDURE — 87081 CULTURE SCREEN ONLY: CPT | Mod: AHULAB | Performed by: PHYSICIAN ASSISTANT

## 2025-03-04 PROCEDURE — E0143 WALKER FOLDING WHEELED W/O S: HCPCS | Performed by: ORTHOPAEDIC SURGERY

## 2025-03-04 RX ORDER — TIRZEPATIDE 5 MG/.5ML
5 INJECTION, SOLUTION SUBCUTANEOUS
COMMUNITY

## 2025-03-04 RX ORDER — BISMUTH SUBSALICYLATE 262 MG
1 TABLET,CHEWABLE ORAL DAILY
COMMUNITY

## 2025-03-04 ASSESSMENT — ENCOUNTER SYMPTOMS
EYES NEGATIVE: 1
BACK PAIN: 1
GASTROINTESTINAL NEGATIVE: 1
NEUROLOGICAL NEGATIVE: 1
CARDIOVASCULAR NEGATIVE: 1
ENDOCRINE NEGATIVE: 1
PSYCHIATRIC NEGATIVE: 1
CONSTITUTIONAL NEGATIVE: 1
HEMATOLOGIC/LYMPHATIC NEGATIVE: 1
RESPIRATORY NEGATIVE: 1
ALLERGIC/IMMUNOLOGIC NEGATIVE: 1

## 2025-03-04 NOTE — CPM/PAT NURSE NOTE
CPM/PAT Nurse Note      Name: Sofia Foreman (Sofia Foreman)  /Age: 1990/34 y.o.       Past Medical History:   Diagnosis Date    Anemia     iron deficiency    Anxiety     HL (hearing loss)     bilateral 35% loss, multiple tympanoplastys    Migraines        Past Surgical History:   Procedure Laterality Date    ANKLE FRACTURE SURGERY Left     HARDWARE REMOVAL Left 10/05/2022    TYMPANOPLASTY      multiple       Patient Sexual activity questions deferred to the physician.    No family history on file.    Allergies   Allergen Reactions    Penicillins Anaphylaxis    Mushroom Rash       Prior to Admission medications    Medication Sig Start Date End Date Taking? Authorizing Provider   aspirin-acetaminophen-caffeine (Excedrin Extra Strength) 250-250-65 mg tablet Take 1 tablet by mouth every 6 hours if needed.   Yes Historical Provider, MD   baclofen (Lioresal) 10 mg tablet Take 1 tablet (10 mg) by mouth 2 times a day. 25  Yes    buPROPion SR (Wellbutrin SR) 200 mg 12 hr tablet Take 1 tablet (200 mg) by mouth once daily. 1/3/25  Yes    clonazePAM (KlonoPIN) 1 mg tablet Take 1 tablet (1 mg) by mouth once daily as needed. 10/30/24  Yes    gabapentin (Neurontin) 300 mg capsule Take 1 capsule (300 mg) by mouth once daily. 10/30/24  Yes    meloxicam (Mobic) 15 mg tablet Take 1 tablet (15 mg) by mouth once daily. 10/30/24  Yes    multivitamin tablet Take 1 tablet by mouth once daily.   Yes Historical Provider, MD   norethindrone-e.estradioL-iron (Loestrin 24 FE) 1 mg-20 mcg (24)/75 mg (4) tablet Take 1 tablet by mouth once daily. Take continuously (skip placebos) 1/3/25  Yes    omeprazole OTC (PriLOSEC OTC) 20 mg EC tablet Take 1 tablet (20 mg) by mouth once daily. 10/3/23  Yes Historical Provider, MD   oxyCODONE (Roxicodone) 5 mg immediate release tablet Take 1 tablet (5 mg) by mouth every 4 hours if needed. 25  Yes Historical Provider, MD   sertraline (Zoloft) 100 mg tablet Take 1 and 1/2 tablets by mouth daily  1/3/25  Yes    topiramate (Topamax) 25 mg tablet Take 1 tablet by mouth every morning and 2 tablets by mouth every evening 1/3/25  Yes    traZODone (Desyrel) 100 mg tablet Take 1 tablet (100 mg) by mouth once daily at bedtime. 1/3/25  Yes    cyclobenzaprine (Flexeril) 10 mg tablet Take 1 tablet (10 mg) by mouth 3 times a day as needed for muscle spasms.  Patient not taking: Reported on 3/4/2025 5/17/24   Brendan Sabillon PA-C   lidocaine 4 % patch Place 1 patch over 12 hours on the skin once daily. Remove & discard patch within 12 hours or as directed by MD.  Patient not taking: Reported on 3/4/2025 5/17/24   rBendan Sabillon PA-C   tirzepatide (Mounjaro) 5 mg/0.5 mL pen injector Inject 5 mg under the skin every 7 days.    Historical Provider, MD CARRI CHAMORRO     DASI Risk Score      Flowsheet Row Questionnaire Series Submission from 2/26/2025 in [facility] with Generic Provider Chang   Can you take care of yourself (eat, dress, bathe, or use toilet)?  2.75  filed at 02/26/2025 0859   Can you walk indoors, such as around your house? 1.75  filed at 02/26/2025 0859   Can you walk a block or two on level ground?  2.75  filed at 02/26/2025 0859   Can you climb a flight of stairs or walk up a hill? 0  filed at 02/26/2025 0859   Can you run a short distance? 0  filed at 02/26/2025 0859   Can you do light work around the house like dusting or washing dishes? 2.7  filed at 02/26/2025 0859   Can you do moderate work around the house like vacuuming, sweeping floors or carrying groceries? 0  filed at 02/26/2025 0859   Can you do heavy work around the house like scrubbing floors or lifting and moving heavy furniture?  0  filed at 02/26/2025 0859   Can you do yard work like raking leaves, weeding or pushing a mower? 0  filed at 02/26/2025 0859   Can you have sexual relations? 0  filed at 02/26/2025 0859   Can you participate in moderate recreational activities like golf, bowling, dancing, doubles tennis or throwing a baseball  or football? 0  filed at 02/26/2025 0859   Can you participate in strenous sports like swimming, singles tennis, football, basketball, or skiing? 0  filed at 02/26/2025 0859   DASI SCORE 9.95  filed at 02/26/2025 0859   METS Score (Will be calculated only when all the questions are answered) 4  filed at 02/26/2025 0859          Caprini DVT Assessment    No data to display       Modified Frailty Index    No data to display       [id number] Stroke Risk Points  Current as of just now        N/A 0 to 9 Points:      Last Change: N/A          The [id number] risk score (Lip TISH, et al. 2009. © 2010 [employer]) quantifies the risk of stroke for a patient with atrial fibrillation. For patients without atrial fibrillation or under the age of 18 this score appears as N/A. Higher score values generally indicate higher risk of stroke.        This score is not applicable to this patient. Components are not calculated.          Revised Cardiac Risk Index    No data to display       Apfel Simplified Score    No data to display       Risk Analysis Index Results This Encounter    No data found in the last 10 encounters.       Stop Bang Score      Flowsheet Row Questionnaire Series Submission from 2/26/2025 in [facility] with Generic Provider Chang   Do you snore loudly? 0  filed at 02/26/2025 0859   Do you often feel tired or fatigued after your sleep? 0  filed at 02/26/2025 0859   Has anyone ever observed you stop breathing in your sleep? 0  filed at 02/26/2025 0859   Do you have or are you being treated for high blood pressure? 0  filed at 02/26/2025 0859   Recent BMI (Calculated) 30.9  filed at 02/26/2025 0859   Is BMI greater than 35 kg/m2? 0=No  filed at 02/26/2025 0859   Age older than 50 years old? 0=No  filed at 02/26/2025 0859   Gender - Male 0=No  filed at 02/26/2025 0859          Prodigy: High Risk  Total Score: 3              Prodigy Previous Opioid Use Score           ARISCAT Score for  Postoperative Pulmonary Complications    No data to display       Singh Perioperative Risk for Myocardial Infarction or Cardiac Arrest (COCO)    No data to display         Nurse Plan of Action:       After Visit Summary (AVS) reviewed and patient verbalized good understanding of medications and NPO instructions.  Pre-op infection prevention measures:  CHG showers and mouthwash reviewed, understanding voiced.  CHG soap given and patient verbalized need to pick CHG mouthwash at their preferred local pharmacy.

## 2025-03-04 NOTE — PREPROCEDURE INSTRUCTIONS
Medication List            Accurate as of March 4, 2025  2:51 PM. Always use your most recent med list.                baclofen 10 mg tablet  Commonly known as: Lioresal  Take 1 tablet (10 mg) by mouth 2 times a day.  Medication Adjustments for Surgery: Take/Use as prescribed     buPROPion  mg 12 hr tablet  Commonly known as: Wellbutrin SR  Take 1 tablet (200 mg) by mouth once daily.  Medication Adjustments for Surgery: Take/Use as prescribed     clonazePAM 1 mg tablet  Commonly known as: KlonoPIN  Take 1 tablet (1 mg) by mouth once daily as needed.  Medication Adjustments for Surgery: Take/Use as prescribed     cyclobenzaprine 10 mg tablet  Commonly known as: Flexeril  Take 1 tablet (10 mg) by mouth 3 times a day as needed for muscle spasms.  Medication Adjustments for Surgery: Take/Use as prescribed     Excedrin Extra Strength 250-250-65 mg tablet  Generic drug: aspirin-acetaminophen-caffeine  Notes to patient: HOLD 7 Days prior to surgery - LD 3/6     gabapentin 300 mg capsule  Commonly known as: Neurontin  Take 1 capsule (300 mg) by mouth once daily.  Medication Adjustments for Surgery: Take/Use as prescribed     Moe 24 Fe 1 mg-20 mcg (24)/75 mg (4) tablet  Generic drug: norethindrone-e.estradioL-iron  Take 1 tablet by mouth once daily. Take continuously (skip placebos)  Medication Adjustments for Surgery: Take/Use as prescribed     lidocaine 4 % patch  Place 1 patch over 12 hours on the skin once daily. Remove & discard patch within 12 hours or as directed by MD.  Medication Adjustments for Surgery: Do Not take on the morning of surgery     meloxicam 15 mg tablet  Commonly known as: Mobic  Take 1 tablet (15 mg) by mouth once daily.  Notes to patient: HOLD 7 Days prior to surgery - LD 3/6     Mounjaro 5 mg/0.5 mL pen injector  Generic drug: tirzepatide  Notes to patient: HOLD 7 Days prior to surgery - LD 3/6     multivitamin tablet  Notes to patient: HOLD 7 Days prior to surgery - LD 3/5      omeprazole OTC 20 mg EC tablet  Commonly known as: PriLOSEC OTC  Medication Adjustments for Surgery: Take on the morning of surgery     oxyCODONE 5 mg immediate release tablet  Commonly known as: Roxicodone  Medication Adjustments for Surgery: Take/Use as prescribed     sertraline 100 mg tablet  Commonly known as: Zoloft  Take 1 and 1/2 tablets by mouth daily  Medication Adjustments for Surgery: Take/Use as prescribed     topiramate 25 mg tablet  Commonly known as: Topamax  Take 1 tablet by mouth every morning and 2 tablets by mouth every evening  Medication Adjustments for Surgery: Take/Use as prescribed     traZODone 100 mg tablet  Commonly known as: Desyrel  Take 1 tablet (100 mg) by mouth once daily at bedtime.  Medication Adjustments for Surgery: Take/Use as prescribed                 Concerning above medication instructions - If medication is normally taken at night continue normal schedule - do not take night prior and morning of surgery.       Preoperative Deep Breathing Exercises  Why it is important to do deep breathing exercises before my surgery?  Deep breathing exercises strengthen your breathing muscles.  This helps you to recover after your surgery and decreases the chance of breathing complications.  How are the deep breathing exercises done?  Sit straight with your back supported.  Breathe in deeply and slowly through your nose. Your lower rib cage should expand and your abdomen may move forward.  Hold that breath for 3 to 5 seconds.  Breathe out through pursed lips, slowly and completely.  Rest and repeat 10 times every hour while awake.  Rest longer if you become dizzy or lightheaded.      CONTACT SURGEON'S OFFICE IF YOU DEVELOP:  * Fever = 100.4 F   * New respiratory symptoms (e.g. cough, shortness of breath, respiratory distress, sore throat)  * Recent loss of taste or smell  *Flu like symptoms such as headache, fatigue or gastrointestinal symptoms  * You develop any open sores, shingles,  burning or painful urination   AND/OR:  * You no longer wish to have the surgery.  * Any other personal circumstances change that may lead to the need to cancel or defer this surgery.  *You were admitted to any hospital within one week of your planned procedure.    SMOKING:  *Quitting smoking can make a huge difference to your health and recovery from surgery.    *If you need help with quitting, call 3-320-QUIT-NOW.    THE DAY OF SURGERY:  *Do not eat any food after midnight the night before surgery/procedure.   *YOU MUST DRINK 14 oz drink clear liquids (i.e. water, black coffee/tea, (no milk or cream) apple juice, and electrolyte drinks (Gatorade)  2 hours before your instructed arrival time to the hospital.  *You may chew gum until  2 hours before your surgery/procedure.    SURGICAL TIME  *You will be contacted between 2 p.m. and 6 p.m. the business day before your surgery with your arrival time.  *If you haven't received a call by 6pm, call [phone].  *Scheduled surgery times may change and you will be notified if this occurs-check your personal voicemail for any updates.    ON THE MORNING OF SURGERY:  *Wear comfortable, loose fitting clothing.   *Do not use moisturizers, creams, lotions or perfume.  *All jewelry and valuables should be left at home.  *Prosthetic devices such as contact lenses, hearing aids, dentures, eyelash extensions, hairpins and body piercing must be removed before surgery.    BRING WITH YOU:  *Photo ID and insurance card  *Current list of medicines and allergies  *Pacemaker/Defibrillator/Heart stent cards  *CPAP machine and mask  *Slings/splints/crutches  *Copy of your complete Advanced Directive/DHPOA-if applicable  *Neurostimulator implant remote    PARKING AND ARRIVAL:  *Check in at the Main Entrance desk and let them know you are here for surgery.  *You will be directed to the 2nd floor surgical waiting area.    AFTER OUTPATIENT SURGERY:  *A responsible adult MUST accompany you at  the time of discharge and stay with you for 24 hours after your surgery.  *You may NOT drive yourself home after surgery.  *You may use a taxi or ride sharing service ([employer], Uber) to return home ONLY if you are accompanied by a friend or family member.  *Instructions for resuming your medications will be provided by your surgeon.      Home Preoperative Antibacterial Shower     What is a home preoperative antibacterial shower?  This shower is a way of cleaning the skin with a germ killing soap before surgery.  The soap contains chlorhexidine, commonly known as CHG.  CHG is a soap for your skin with germ killing ability.  Let your doctor know if you are allergic to chlorhexidine.    Why do I need to take a preoperative antibacterial shower?  Skin is not sterile.  It is best to try to make your skin as free of germs as possible before surgery.  Proper cleansing with a germ killing soap before surgery can lower the number of germs on your skin.  This helps to reduce the risk of infection at the surgical site.  Following the instructions listed below will help you prepare your skin for surgery.      How do I use the CHG skin cleanser?  Steps:  Begin using your CHG soap five days before your scheduled surgery on ________________________.    Days 1-4 Shower before bed:  Wash your face and genitals with your normal soap and rinse.           2.    Apply the CHG soap to a clean wet washcloth.  Turn the water off or move away                From the water spray to avoid premature rinsing of the CHG soap as you are applying.     3.   Lather your entire body from the neck down.  Do not use on your face or genitals.  4. Pay special attention to the area(s) where your incision(s) will be located unless they are on your face.  Avoid scrubbing your skin too hard.  The important point is to have the CHG soap sit on your skin for 3 minutes.    When the 3 minutes are up, turn on the water and rinse the CHG soap off your body  completely.   Pat yourself dry with a clean, freshly-laundered towel.  Dress in clean, freshly laundered night clothes.    Be sure to sleep with clean, freshly laundered sheets.  Day 5:  Last shower is the morning of surgery: Follow above Instructions.    NOTE:        *Keep CHG soap out of eyes and ear canals   *DO NOT wash with regular soap on your body after you have used the CHG soap solution  *DO NOT apply powders, lotions, or perfume.  *Deodorant may be used days 1-4, BUT NOT the day of surgery    Who should I contact if I have any questions regarding the use of CHG soap?  Call the [facility], Preadmission Testing at [phone] if you have any questions.              Patient Information: Pre-Operative Infection Prevention Measures     Why did I have my nose, under my arms and groin swabbed?  The purpose of the swab is to identify Staphylococcus aureus inside your nose or on your skin.  The swab was sent to the laboratory for culture.  A positive swab/culture for Staphylococcus aureus is called colonization or carriage.      What is Staphylococcus aureus?  Staphylococcus aureus, also known as “staph”, is a germ found on the skin or in the nose of healthy people.  Sometimes Staphylococcus aureus can get into the body and cause an infection.  This can be minor (such as pimples, boils or other skin problems).  It might also be serious (such as blood infection, pneumonia or a surgical site infection).    What is Staphylococcus aureus colonization or carriage?  Colonization or carriage means that a person has the germ but is not sick from it.  These bacteria can be spread on the hands or when breathing or sneezing.    How is Staphylococcus aureus spread?  It is most often spread by close contact with a person or item that carries it.    What happens if my culture is positive for Staphylococcus aureus?  Your doctor/medical team will use this information to guide any antibiotic  treatment which may be necessary.  Regardless of the culture results, we will clean the inside of your nose with a betadine swab just before you have your surgery.      Will I get an infection if I have Staphylococcus aureus in my nose or on my skin?  Anyone can get an infection with Staphylococcus aureus.  However, the best way to reduce your risk of infection is to follow the instructions provided to you for the use of your CHG soap and dental rinse.        Who should I contact if I have any questions?  Call the [facility], Preadmission Testing at [phone] if you have any questions.           Patient Information: Oral/Dental Rinse  **This is a prescription; pick it up at your preferred local pharmacy **  What is oral/dental rinse?   It is a mouthwash. It is a way of cleaning the mouth with a germ killing solution before your surgery.  The solution contains chlorhexidine, commonly known as CHG.   It is used inside the mouth to kill a bacteria known as Staphylococcus aureus.  Let your doctor know if you are allergic to Chlorhexidine.    Why do I need to use CHG oral/dental rinse?  The CHG oral/dental rinse helps to kill a bacteria in your mouth known a Staphylococcus aureus.     This reduces the risk of infection at the surgical site.      Using your CHG oral/dental rinse  STEPS:  Use your CHG oral/dental rinse after you brush your teeth the night before (at bedtime) and the morning of your surgery.  Follow all directions on your prescription label.    Use the cap on the container to measure 15ml (fill cap to fill line)  Swish (gargle if you can) the mouthwash in your mouth for at least 30 seconds, (do not to swallow) spit out  After you use your CHG rinse, do not rinse your mouth with water, drink or eat.  Please refer to prescription label for the appropriate time to resume oral intake  Dental rinse comes in one size bottle: 473ml ~16oz.  You will have leftover    rinse, discard  after this use.    What side effects might I have using the CHG oral/dental rinse?  CHG rinse will stick to plaque on the teeth.  Brush and floss just before use.  Teeth brushing will help avoid staining of plaque during use.    Who should I contact if I have questions about the CHG oral/dental rinse?  Please call [facility], Preadmission Testing at [phone] if you have any questions

## 2025-03-04 NOTE — CPM/PAT H&P
[facility]       Name: Sofia Foreman (Sofia Foreman)  /Age: 34 y.o.     In-Person       Chief Complaint: Other intervertebral disc displacement, lumbar region [M51.26]     HPI      Date of Consult: 3/5/25    Referring Provider: Dr. King     Surgery, Date, and Length: L4-L5 Lumbar Spine Microdiscectomy ; 3/14/25; 120 minutes     Sofia Foreman  is a 34 year-old female who presents to the [facility] for perioperative risk assessment prior to surgery. She ~8 weeks ago developed the abrupt onset of severe right sciatica with pain and weakness. She has had periodic episodes of low back in the past. No prior radicular symptoms. She is having great difficulty standing and walking. It has been very difficult for her to work. MRI 25:   Multilevel degenerative lumbar spondylosis. At L4-5 there is a prominent dorsal central through right lateral recess disc extrusion  with inferior migration extending into the right subarticular lateral recess. This severely narrows the right lateral recess and puts  considerable fact upon the right L5 nerve root and dorsally displaces the right S1 nerve root in the thecal sac.          This note was created in part upon personal review of patient's medical records.      Patient is scheduled to have L4-L5 Lumbar Spine Microdiscectomy     Medical History  Past Medical History:   Diagnosis Date    Anemia     iron deficiency    Anxiety     GERD (gastroesophageal reflux disease)     under control    HL (hearing loss)     bilateral 35% loss, multiple tympanoplastys    Migraines             Surgical History  Past Surgical History:   Procedure Laterality Date    ANKLE FRACTURE SURGERY Left     HARDWARE REMOVAL Left 10/05/2022    TYMPANOPLASTY      multiple             Family history:  No family history on file.     Social history:  Social History     Socioeconomic History    Marital status: Single     Spouse name: Not on file    Number of children: Not on file    Years of  education: Not on file    Highest education level: Not on file   Occupational History    Not on file   Tobacco Use    Smoking status: Never    Smokeless tobacco: Never   Vaping Use    Vaping status: Never Used   Substance and Sexual Activity    Alcohol use: Yes     Comment: 2-4/month    Drug use: Never    Sexual activity: Defer   Other Topics Concern    Not on file   Social History Narrative    Not on file     Social Drivers of Health     Financial Resource Strain: Not on file   Food Insecurity: Not on file   Transportation Needs: Not on file   Physical Activity: Not on file   Stress: Not on file   Social Connections: Not on file   Intimate Partner Violence: Not on file   Housing Stability: Not on file        Current Outpatient Medications   Medication Instructions    aspirin-acetaminophen-caffeine (Excedrin Extra Strength) 250-250-65 mg tablet 1 tablet, Every 6 hours PRN    baclofen (LIORESAL) 10 mg, oral, 2 times daily    buPROPion SR (WELLBUTRIN SR) 200 mg, oral, Daily    clonazePAM (KLONOPIN) 1 mg, oral, Daily PRN    cyclobenzaprine (FLEXERIL) 10 mg, oral, 3 times daily PRN    gabapentin (NEURONTIN) 300 mg, oral, Daily    lidocaine 4 % patch 1 patch, transdermal, Daily, Remove & discard patch within 12 hours or as directed by MD.    meloxicam (MOBIC) 15 mg, oral, Daily    Mounjaro 5 mg, subcutaneous, Every 7 days    multivitamin tablet 1 tablet, Daily    norethindrone-e.estradioL-iron (Loestrin 24 FE) 1 mg-20 mcg (24)/75 mg (4) tablet Take 1 tablet by mouth once daily. Take continuously (skip placebos)    omeprazole OTC (PRILOSEC OTC) 20 mg, Daily RT    oxyCODONE (ROXICODONE) 5 mg, Every 4 hours PRN    sertraline (Zoloft) 100 mg tablet Take 1 and 1/2 tablets by mouth daily    topiramate (Topamax) 25 mg tablet Take 1 tablet by mouth every morning and 2 tablets by mouth every evening    traZODone (DESYREL) 100 mg, oral, Nightly       Visit Vitals  /68   Pulse 90   Temp 36.5 °C (97.7 °F)   Resp 16   Ht 1.62 m  "(5' 3.78\")   Wt 81 kg (178 lb 9.2 oz)   SpO2 96%   BMI 30.86 kg/m²   OB Status Having periods   Smoking Status Never   BSA 1.91 m²             Review of Systems   Constitutional: Negative.    HENT: Negative.     Eyes: Negative.    Respiratory: Negative.     Cardiovascular: Negative.         METS 4   prior to back no limitations Without chest pain / SOB    Gastrointestinal: Negative.    Endocrine: Negative.    Genitourinary: Negative.    Musculoskeletal:  Positive for back pain (with associated symptoms).   Skin: Negative.    Allergic/Immunologic: Negative.    Neurological: Negative.    Hematological: Negative.    Psychiatric/Behavioral: Negative.          Physical Exam  Vitals reviewed.   Constitutional:       Appearance: Normal appearance.   HENT:      Head: Normocephalic and atraumatic.      Right Ear: External ear normal.      Left Ear: External ear normal.      Nose: Nose normal.      Mouth/Throat:      Pharynx: Oropharynx is clear.   Eyes:      Extraocular Movements: Extraocular movements intact.      Conjunctiva/sclera: Conjunctivae normal.      Pupils: Pupils are equal, round, and reactive to light.   Cardiovascular:      Rate and Rhythm: Normal rate and regular rhythm.      Heart sounds: Normal heart sounds.   Pulmonary:      Effort: Pulmonary effort is normal.      Breath sounds: Normal breath sounds.   Abdominal:      Palpations: Abdomen is soft.   Musculoskeletal:         General: Normal range of motion.      Cervical back: Normal range of motion and neck supple.   Skin:     General: Skin is warm and dry.   Neurological:      General: No focal deficit present.      Mental Status: She is alert and oriented to person, place, and time.   Psychiatric:         Mood and Affect: Mood normal.         Behavior: Behavior normal.          PAT AIRWAY:   Airway:     Mallampati::  II    Neck ROM::  Full    Lab Results   Component Value Date    WBC 8.8 03/04/2025    HGB 14.8 03/04/2025    HCT 45.0 03/04/2025    MCV 85 " 03/04/2025     03/04/2025      Lab Results   Component Value Date    GLUCOSE 95 03/04/2025    CALCIUM 9.3 03/04/2025     03/04/2025    K 4.4 03/04/2025    CO2 24 03/04/2025     (H) 03/04/2025    BUN 17 03/04/2025    CREATININE 0.86 03/04/2025      Lab Results   Component Value Date    HGBA1C 4.8 03/04/2025      Protime  9.8 - 12.4 seconds 14.4 High    INR  0.9 - 1.1 1.3 High    aPTT  26 - 36 seconds 30     Urine Culture  Order: [id number] - Reflex for Order [id number]   Collected 3/4/2025 15:11       Status: Final result       Visible to patient: Yes (seen)       Dx: Preop testing    Specimen Information: Clean Catch/Voided; Urine   0 Result Notes  Urine Culture Clinically insignificant growth based on current clinical standards.        Resulting Agency: [facility]          Specimen Collected: 03/04/25 15:11     Patient is scheduled to have L4-L5 Lumbar Spine Microdiscectomy     Cardiovascular  METS: 4   RCRI: 0  , 3.9 % Risk of MACE  Caprini: 3    Pulmonary  Stop Bang score is 0 placing patient at low risk for PAM  ARISCAT: <26 points, 1.6% risk of in-hospital postoperative pulmonary complication  PRODIGY: Low risk for opioid induced respiratory depression      Endocrinology  Weight loss- mounjaro HOLD 7 Days prior to surgery     Hematology       Patient instructed to ambulate as soon as possible postoperatively to decrease thromboembolic risk.      Initiate mechanical DVT prophylaxis as soon as possible and initiate chemical prophylaxis when deemed safe from a bleeding standpoint post surgery.       VTE prophylaxis per surgical team     GI  GERD- omeprazole Continue DOS     Tests ordered in PAT: cbc, bmp,coag,A1c,ua, mrsa   LABS REVIEWED: unremarkable     Risk assessment complete.  Patient is scheduled for a intermediate surgical risk procedure.        Preoperative medication instructions were provided and reviewed with the patient.  Any additional testing or evaluation was explained to the patient.   Nothing by mouth instructions were discussed and patient's questions were answered prior to conclusion to this encounter.  Patient verbalized understanding of preoperative instructions given in preadmission testing; discharge instructions available in EMR.

## 2025-03-05 LAB
BACTERIA UR CULT: NORMAL
HOLD SPECIMEN: NORMAL

## 2025-03-06 LAB — STAPHYLOCOCCUS SPEC CULT: NORMAL

## 2025-03-13 ENCOUNTER — ANESTHESIA EVENT (OUTPATIENT)
Dept: OPERATING ROOM | Facility: HOSPITAL | Age: 35
End: 2025-03-13
Payer: COMMERCIAL

## 2025-03-14 ENCOUNTER — PHARMACY VISIT (OUTPATIENT)
Dept: PHARMACY | Facility: CLINIC | Age: 35
End: 2025-03-14
Payer: COMMERCIAL

## 2025-03-14 ENCOUNTER — HOME HEALTH ADMISSION (OUTPATIENT)
Dept: HOME HEALTH SERVICES | Facility: HOME HEALTH | Age: 35
End: 2025-03-14
Payer: COMMERCIAL

## 2025-03-14 ENCOUNTER — APPOINTMENT (OUTPATIENT)
Dept: RADIOLOGY | Facility: HOSPITAL | Age: 35
End: 2025-03-14
Payer: COMMERCIAL

## 2025-03-14 ENCOUNTER — DOCUMENTATION (OUTPATIENT)
Dept: HOME HEALTH SERVICES | Facility: HOME HEALTH | Age: 35
End: 2025-03-14

## 2025-03-14 ENCOUNTER — HOSPITAL ENCOUNTER (OUTPATIENT)
Facility: HOSPITAL | Age: 35
Setting detail: OUTPATIENT SURGERY
Discharge: HOME | End: 2025-03-14
Attending: ORTHOPAEDIC SURGERY | Admitting: ORTHOPAEDIC SURGERY
Payer: COMMERCIAL

## 2025-03-14 ENCOUNTER — ANESTHESIA (OUTPATIENT)
Dept: OPERATING ROOM | Facility: HOSPITAL | Age: 35
End: 2025-03-14
Payer: COMMERCIAL

## 2025-03-14 VITALS
RESPIRATION RATE: 16 BRPM | SYSTOLIC BLOOD PRESSURE: 112 MMHG | DIASTOLIC BLOOD PRESSURE: 74 MMHG | OXYGEN SATURATION: 100 % | TEMPERATURE: 99.1 F | HEART RATE: 95 BPM

## 2025-03-14 DIAGNOSIS — M51.26 OTHER INTERVERTEBRAL DISC DISPLACEMENT, LUMBAR REGION: Primary | ICD-10-CM

## 2025-03-14 DIAGNOSIS — M51.26 HERNIATED LUMBAR DISC WITHOUT MYELOPATHY: ICD-10-CM

## 2025-03-14 LAB — PREGNANCY TEST URINE, POC: NEGATIVE

## 2025-03-14 PROCEDURE — 3700000002 HC GENERAL ANESTHESIA TIME - EACH INCREMENTAL 1 MINUTE: Performed by: ORTHOPAEDIC SURGERY

## 2025-03-14 PROCEDURE — 2500000004 HC RX 250 GENERAL PHARMACY W/ HCPCS (ALT 636 FOR OP/ED): Mod: JZ | Performed by: ANESTHESIOLOGY

## 2025-03-14 PROCEDURE — 72020 X-RAY EXAM OF SPINE 1 VIEW: CPT

## 2025-03-14 PROCEDURE — 3700000001 HC GENERAL ANESTHESIA TIME - INITIAL BASE CHARGE: Performed by: ORTHOPAEDIC SURGERY

## 2025-03-14 PROCEDURE — RXMED WILLOW AMBULATORY MEDICATION CHARGE

## 2025-03-14 PROCEDURE — 7100000002 HC RECOVERY ROOM TIME - EACH INCREMENTAL 1 MINUTE: Performed by: ORTHOPAEDIC SURGERY

## 2025-03-14 PROCEDURE — 2500000005 HC RX 250 GENERAL PHARMACY W/O HCPCS: Performed by: ANESTHESIOLOGY

## 2025-03-14 PROCEDURE — 2500000005 HC RX 250 GENERAL PHARMACY W/O HCPCS: Performed by: ORTHOPAEDIC SURGERY

## 2025-03-14 PROCEDURE — 63030 LAMOT DCMPRN NRV RT 1 LMBR: CPT | Performed by: ORTHOPAEDIC SURGERY

## 2025-03-14 PROCEDURE — 7100000001 HC RECOVERY ROOM TIME - INITIAL BASE CHARGE: Performed by: ORTHOPAEDIC SURGERY

## 2025-03-14 PROCEDURE — 2500000004 HC RX 250 GENERAL PHARMACY W/ HCPCS (ALT 636 FOR OP/ED): Performed by: ANESTHESIOLOGIST ASSISTANT

## 2025-03-14 PROCEDURE — 81025 URINE PREGNANCY TEST: CPT | Performed by: ORTHOPAEDIC SURGERY

## 2025-03-14 PROCEDURE — 2720000007 HC OR 272 NO HCPCS: Performed by: ORTHOPAEDIC SURGERY

## 2025-03-14 PROCEDURE — 2500000001 HC RX 250 WO HCPCS SELF ADMINISTERED DRUGS (ALT 637 FOR MEDICARE OP): Performed by: ANESTHESIOLOGY

## 2025-03-14 PROCEDURE — 3600000009 HC OR TIME - EACH INCREMENTAL 1 MINUTE - PROCEDURE LEVEL FOUR: Performed by: ORTHOPAEDIC SURGERY

## 2025-03-14 PROCEDURE — 2500000005 HC RX 250 GENERAL PHARMACY W/O HCPCS: Performed by: ANESTHESIOLOGIST ASSISTANT

## 2025-03-14 PROCEDURE — 3600000004 HC OR TIME - INITIAL BASE CHARGE - PROCEDURE LEVEL FOUR: Performed by: ORTHOPAEDIC SURGERY

## 2025-03-14 PROCEDURE — 97161 PT EVAL LOW COMPLEX 20 MIN: CPT | Mod: GP

## 2025-03-14 PROCEDURE — 7100000009 HC PHASE TWO TIME - INITIAL BASE CHARGE: Performed by: ORTHOPAEDIC SURGERY

## 2025-03-14 PROCEDURE — 7100000010 HC PHASE TWO TIME - EACH INCREMENTAL 1 MINUTE: Performed by: ORTHOPAEDIC SURGERY

## 2025-03-14 RX ORDER — LIDOCAINE HYDROCHLORIDE 20 MG/ML
INJECTION, SOLUTION EPIDURAL; INFILTRATION; INTRACAUDAL; PERINEURAL AS NEEDED
Status: DISCONTINUED | OUTPATIENT
Start: 2025-03-14 | End: 2025-03-14

## 2025-03-14 RX ORDER — SODIUM CHLORIDE, SODIUM LACTATE, POTASSIUM CHLORIDE, CALCIUM CHLORIDE 600; 310; 30; 20 MG/100ML; MG/100ML; MG/100ML; MG/100ML
100 INJECTION, SOLUTION INTRAVENOUS CONTINUOUS
Status: ACTIVE | OUTPATIENT
Start: 2025-03-14 | End: 2025-03-14

## 2025-03-14 RX ORDER — MIDAZOLAM HYDROCHLORIDE 1 MG/ML
INJECTION INTRAMUSCULAR; INTRAVENOUS AS NEEDED
Status: DISCONTINUED | OUTPATIENT
Start: 2025-03-14 | End: 2025-03-14

## 2025-03-14 RX ORDER — ROCURONIUM BROMIDE 10 MG/ML
INJECTION, SOLUTION INTRAVENOUS AS NEEDED
Status: DISCONTINUED | OUTPATIENT
Start: 2025-03-14 | End: 2025-03-14

## 2025-03-14 RX ORDER — CEFAZOLIN 1 G/1
INJECTION, POWDER, FOR SOLUTION INTRAVENOUS AS NEEDED
Status: DISCONTINUED | OUTPATIENT
Start: 2025-03-14 | End: 2025-03-14

## 2025-03-14 RX ORDER — METHOCARBAMOL 100 MG/ML
INJECTION, SOLUTION INTRAMUSCULAR; INTRAVENOUS AS NEEDED
Status: DISCONTINUED | OUTPATIENT
Start: 2025-03-14 | End: 2025-03-14

## 2025-03-14 RX ORDER — FENTANYL CITRATE 50 UG/ML
INJECTION, SOLUTION INTRAMUSCULAR; INTRAVENOUS AS NEEDED
Status: DISCONTINUED | OUTPATIENT
Start: 2025-03-14 | End: 2025-03-14

## 2025-03-14 RX ORDER — METOCLOPRAMIDE HYDROCHLORIDE 5 MG/ML
10 INJECTION INTRAMUSCULAR; INTRAVENOUS ONCE AS NEEDED
Status: DISCONTINUED | OUTPATIENT
Start: 2025-03-14 | End: 2025-03-14 | Stop reason: HOSPADM

## 2025-03-14 RX ORDER — BACITRACIN 500 [USP'U]/G
OINTMENT TOPICAL AS NEEDED
Status: DISCONTINUED | OUTPATIENT
Start: 2025-03-14 | End: 2025-03-14 | Stop reason: HOSPADM

## 2025-03-14 RX ORDER — METHOCARBAMOL 500 MG/1
TABLET, FILM COATED ORAL
Qty: 60 TABLET | Refills: 2 | Status: SHIPPED | OUTPATIENT
Start: 2025-03-14

## 2025-03-14 RX ORDER — ACETAMINOPHEN 325 MG/1
650 TABLET ORAL EVERY 4 HOURS PRN
Status: DISCONTINUED | OUTPATIENT
Start: 2025-03-14 | End: 2025-03-14 | Stop reason: HOSPADM

## 2025-03-14 RX ORDER — SODIUM CHLORIDE 0.9 G/100ML
INJECTION, SOLUTION IRRIGATION AS NEEDED
Status: DISCONTINUED | OUTPATIENT
Start: 2025-03-14 | End: 2025-03-14 | Stop reason: HOSPADM

## 2025-03-14 RX ORDER — OXYCODONE HYDROCHLORIDE 5 MG/1
5 TABLET ORAL EVERY 4 HOURS PRN
Status: DISCONTINUED | OUTPATIENT
Start: 2025-03-14 | End: 2025-03-14 | Stop reason: HOSPADM

## 2025-03-14 RX ORDER — KETOROLAC TROMETHAMINE 30 MG/ML
INJECTION, SOLUTION INTRAMUSCULAR; INTRAVENOUS AS NEEDED
Status: DISCONTINUED | OUTPATIENT
Start: 2025-03-14 | End: 2025-03-14

## 2025-03-14 RX ORDER — OXYCODONE HYDROCHLORIDE 5 MG/1
5 TABLET ORAL EVERY 4 HOURS PRN
Qty: 30 TABLET | Refills: 0 | Status: SHIPPED | OUTPATIENT
Start: 2025-03-14

## 2025-03-14 RX ORDER — PROPOFOL 10 MG/ML
INJECTION, EMULSION INTRAVENOUS CONTINUOUS PRN
Status: DISCONTINUED | OUTPATIENT
Start: 2025-03-14 | End: 2025-03-14

## 2025-03-14 RX ORDER — LIDOCAINE HYDROCHLORIDE 10 MG/ML
0.1 INJECTION, SOLUTION EPIDURAL; INFILTRATION; INTRACAUDAL; PERINEURAL ONCE
Status: DISCONTINUED | OUTPATIENT
Start: 2025-03-14 | End: 2025-03-14 | Stop reason: HOSPADM

## 2025-03-14 RX ORDER — ONDANSETRON HYDROCHLORIDE 2 MG/ML
INJECTION, SOLUTION INTRAVENOUS AS NEEDED
Status: DISCONTINUED | OUTPATIENT
Start: 2025-03-14 | End: 2025-03-14

## 2025-03-14 RX ORDER — ACETAMINOPHEN 500 MG
500 TABLET ORAL EVERY 6 HOURS
Qty: 112 TABLET | Refills: 0 | Status: SHIPPED | OUTPATIENT
Start: 2025-03-14 | End: 2025-04-11

## 2025-03-14 RX ADMIN — ROCURONIUM BROMIDE 70 MG: 10 INJECTION INTRAVENOUS at 07:36

## 2025-03-14 RX ADMIN — HYDROMORPHONE HYDROCHLORIDE 0.5 MG: 1 INJECTION, SOLUTION INTRAMUSCULAR; INTRAVENOUS; SUBCUTANEOUS at 09:09

## 2025-03-14 RX ADMIN — OXYCODONE HYDROCHLORIDE 5 MG: 5 TABLET ORAL at 10:30

## 2025-03-14 RX ADMIN — KETOROLAC TROMETHAMINE 15 MG: 30 INJECTION, SOLUTION INTRAMUSCULAR at 08:34

## 2025-03-14 RX ADMIN — HYDROMORPHONE HYDROCHLORIDE 0.5 MG: 1 INJECTION, SOLUTION INTRAMUSCULAR; INTRAVENOUS; SUBCUTANEOUS at 09:22

## 2025-03-14 RX ADMIN — PROPOFOL 25 MCG/KG/MIN: 10 INJECTION, EMULSION INTRAVENOUS at 07:48

## 2025-03-14 RX ADMIN — ONDANSETRON 4 MG: 2 INJECTION, SOLUTION INTRAMUSCULAR; INTRAVENOUS at 08:42

## 2025-03-14 RX ADMIN — Medication 15 MG: at 07:46

## 2025-03-14 RX ADMIN — Medication 10 MG: at 08:36

## 2025-03-14 RX ADMIN — FENTANYL CITRATE 100 MCG: 50 INJECTION, SOLUTION INTRAMUSCULAR; INTRAVENOUS at 07:36

## 2025-03-14 RX ADMIN — CEFAZOLIN 1.9 G: 1 INJECTION, POWDER, FOR SOLUTION INTRAMUSCULAR; INTRAVENOUS at 07:49

## 2025-03-14 RX ADMIN — SODIUM CHLORIDE, POTASSIUM CHLORIDE, SODIUM LACTATE AND CALCIUM CHLORIDE: 600; 310; 30; 20 INJECTION, SOLUTION INTRAVENOUS at 07:30

## 2025-03-14 RX ADMIN — Medication 15 MG: at 08:29

## 2025-03-14 RX ADMIN — LIDOCAINE HYDROCHLORIDE 100 MG: 20 INJECTION, SOLUTION EPIDURAL; INFILTRATION; INTRACAUDAL; PERINEURAL at 07:36

## 2025-03-14 RX ADMIN — MIDAZOLAM HYDROCHLORIDE 2 MG: 1 INJECTION, SOLUTION INTRAMUSCULAR; INTRAVENOUS at 07:30

## 2025-03-14 RX ADMIN — HYDROMORPHONE HYDROCHLORIDE 0.5 MG: 1 INJECTION, SOLUTION INTRAMUSCULAR; INTRAVENOUS; SUBCUTANEOUS at 10:02

## 2025-03-14 RX ADMIN — SUGAMMADEX 200 MG: 100 INJECTION, SOLUTION INTRAVENOUS at 08:49

## 2025-03-14 RX ADMIN — Medication 10 MG: at 07:36

## 2025-03-14 RX ADMIN — CEFAZOLIN 0.1 G: 1 INJECTION, POWDER, FOR SOLUTION INTRAMUSCULAR; INTRAVENOUS at 07:44

## 2025-03-14 RX ADMIN — CARBOXYMETHYLCELLULOSE SODIUM 2 DROP: 5 SOLUTION/ DROPS OPHTHALMIC at 07:37

## 2025-03-14 RX ADMIN — PROPOFOL 200 MCG/KG/MIN: 10 INJECTION, EMULSION INTRAVENOUS at 07:36

## 2025-03-14 RX ADMIN — HYDROMORPHONE HYDROCHLORIDE 0.5 MG: 1 INJECTION, SOLUTION INTRAMUSCULAR; INTRAVENOUS; SUBCUTANEOUS at 10:29

## 2025-03-14 RX ADMIN — DEXAMETHASONE SODIUM PHOSPHATE 10 MG: 4 INJECTION, SOLUTION INTRAMUSCULAR; INTRAVENOUS at 07:49

## 2025-03-14 RX ADMIN — Medication 6 L/MIN: at 11:30

## 2025-03-14 RX ADMIN — METHOCARBAMOL 1000 MG: 1000 INJECTION, SOLUTION INTRAMUSCULAR; INTRAVENOUS at 08:36

## 2025-03-14 RX ADMIN — HYDROMORPHONE HYDROCHLORIDE 0.5 MG: 1 INJECTION, SOLUTION INTRAMUSCULAR; INTRAVENOUS; SUBCUTANEOUS at 09:35

## 2025-03-14 SDOH — HEALTH STABILITY: MENTAL HEALTH: CURRENT SMOKER: 0

## 2025-03-14 ASSESSMENT — PAIN - FUNCTIONAL ASSESSMENT
PAIN_FUNCTIONAL_ASSESSMENT: 0-10
PAIN_FUNCTIONAL_ASSESSMENT: UNABLE TO SELF-REPORT
PAIN_FUNCTIONAL_ASSESSMENT: 0-10

## 2025-03-14 ASSESSMENT — PAIN SCALES - GENERAL
PAINLEVEL_OUTOF10: 8
PAINLEVEL_OUTOF10: 8
PAINLEVEL_OUTOF10: 7
PAINLEVEL_OUTOF10: 8
PAINLEVEL_OUTOF10: 4
PAINLEVEL_OUTOF10: 9
PAINLEVEL_OUTOF10: 8
PAINLEVEL_OUTOF10: 4
PAINLEVEL_OUTOF10: 4
PAINLEVEL_OUTOF10: 8
PAINLEVEL_OUTOF10: 6
PAINLEVEL_OUTOF10: 4
PAINLEVEL_OUTOF10: 8
PAINLEVEL_OUTOF10: 4
PAINLEVEL_OUTOF10: 7
PAINLEVEL_OUTOF10: 7

## 2025-03-14 ASSESSMENT — PAIN DESCRIPTION - LOCATION
LOCATION: BACK

## 2025-03-14 ASSESSMENT — COGNITIVE AND FUNCTIONAL STATUS - GENERAL: MOBILITY SCORE: 24

## 2025-03-14 ASSESSMENT — PAIN DESCRIPTION - ORIENTATION
ORIENTATION: LOWER
ORIENTATION: MID
ORIENTATION: LOWER
ORIENTATION: LOWER

## 2025-03-14 ASSESSMENT — ACTIVITIES OF DAILY LIVING (ADL): ADL_ASSISTANCE: INDEPENDENT

## 2025-03-14 NOTE — ANESTHESIA PROCEDURE NOTES
Airway  Date/Time: 3/14/2025 7:54 AM  Urgency: elective    Airway not difficult    Staffing  Performed: TRACI   Authorized by: Tor Rodrigez MD    Performed by: TRACI Gauthier  Patient location during procedure: OR    Indications and Patient Condition  Indications for airway management: anesthesia  Spontaneous Ventilation: absent  Sedation level: deep  Preoxygenated: yes  Patient position: sniffing  Mask difficulty assessment: 1 - vent by mask  Planned trial extubation    Final Airway Details  Final airway type: endotracheal airway      Successful airway: ETT  Cuffed: yes   Successful intubation technique: direct laryngoscopy  Facilitating devices/methods: intubating stylet  Endotracheal tube insertion site: oral  Blade: Esdras  Blade size: #3  ETT size (mm): 7.0  Cormack-Lehane Classification: grade I - full view of glottis  Placement verified by: chest auscultation and capnometry   Cuff volume (mL): 5  Measured from: teeth  ETT to teeth (cm): 20  Number of attempts at approach: 1

## 2025-03-14 NOTE — OP NOTE
L4-L5 Lumbar Spine Microdiscectomy Operative Note     Date: 3/14/2025  OR Location: [facility] OR    Name: Sofia Foreman, : 1990, Age: 34 y.o., MRN: [id number], Sex: female    Diagnosis  Pre-Op Diagnosis: L4-5 HNP Post-Op diagnosis: L4-5 HNP     Procedures  L4-L5 Lumbar Spine Microdiscectomy  33904 - [facility] LAMNOTMY INCL W/DCMPRSN NRV ROOT 1 INTRSPC LUMBR      Surgeons      * Jeffrey King - Primary    Resident/Fellow/Other Assistant:  Surgeons and Role:     * Shayan Soriano PA-C - Assisting (sole and primary Assistant; there was no qualified orthopedic resident available)    Staff:   Circulator: Rosa Santiago Person: Liza    Anesthesia Staff: Anesthesiologist: Tor Rodrigez MD  C-AA: TRACI Gauthier  Frontline Breaker: JOSEPH Iverson-CRNA    Procedure Summary  Anesthesia: General  ASA: II  Estimated Blood Loss: 3 mL  Intra-op Medications:   Administrations occurring from 0730 to 0930 on 25:   Medication Name Total Dose   thrombin-bovine (JMI) 5,000 unit topical solution 10,000 Units   bacitracin ointment 1 Application   sodium chloride 0.9 % irrigation solution 1,000 mL   gelatin sponge,absorb-porcine (Gelfoam) sponge 1 each   ceFAZolin (Ancef) vial 1 g 2 g   dexAMETHasone (Decadron) injection 4 mg/mL 10 mg   fentaNYL (Sublimaze) injection 50 mcg/mL 100 mcg   ketamine injection 50 mg/ 5 mL (10 mg/mL) 50 mg   ketorolac (Toradol) injection 30 mg 15 mg   LR bolus Cannot be calculated   lidocaine PF (Xylocaine-MPF) local injection 2 % 100 mg   lubricating eye drops ophthalmic solution 2 drop   methocarbamol (Robaxin) injection 1,000 mg   midazolam PF (Versed) injection 1 mg/mL 2 mg   ondansetron (Zofran) 2 mg/mL injection 4 mg   propofol (Diprivan) injection 10 mg/mL 297.72 mg   rocuronium (ZeMuron) 50 mg/5 mL injection 70 mg   sugammadex (Bridion) 200 mg/2 mL injection 200 mg              Anesthesia Record               Intraprocedure I/O Totals       None           Clinical note: This  woman has developed disabling right sciatica.  She has a large L4-5 disc herniation that correlates with her symptoms.  She has not responded to appropriate nonsurgical management.  Currently she presents for a microdiscectomy.  The rationale for the above-noted procedure has been discussed at length as have the risks benefits expectations limitations alternatives and potential complications.  She understands and wishes to proceed.    Specific risks including but not limited to hematoma, infection, nerve root injury, persistent weakness, spinal fluid leak, recurrent disc herniation, need for further surgery, and limited improvement have been discussed.  She understands and wishes to proceed.    She was brought to the operating room.  A huddle was held, she was identified, antibiotics administered, sequential compression devices placed.  A general anesthetic was secured.  She was carefully placed in the prone position on the [place] frame with all body prominences well-padded.  Her back was prepped and draped in sterile fashion.  A needle was placed percutaneously and an x-ray obtained to guide our incision.  A small midline incision was made and carried down sharply through skin and subcutaneous tissue.  Subperiosteal dissection on the right only expose the L4-5 hemilamina.  Care was taken to protect the facet capsule.  Second radiograph confirmed appropriate levels.  A laminotomy was performed on the inferior aspect of L4 and the superior aspect of L5.  The ligamentum flavum was elevated.  The L5 nerve root was noted to be quite posteriorly displaced.  With extreme care it was retracted medially to reveal a large extruded disc herniation.  An incision over the membrane was made and a large soft fragment was removed and a careful fashion.  Carefully the undersurface of the thecal sac and nerve root were appropriate no additional fragments were noted.  The wound was copiously irrigated.  Very meticulous hemostasis  was obtained.  The deep fascia was closed with interrupted #1 Vicryl, the subcutaneous tissue with interrupted 2-0 Vicryl and the skin with a 4-0 Vicryl in a running subcuticular fashion.  Steri-Strips and a dry sterile dressing were applied.  She was carefully turned into the supine position on her hospital bed, awakened and extubated and transferred to the recovery room in stable condition.    ** Dictated with voice recognition software and not immediately reviewed for errors in grammar and/or spelling **  Attending Attestation: I was present and scrubbed for the entire procedure.    Jeffrey King  Phone Number: [phone]

## 2025-03-14 NOTE — PROGRESS NOTES
Physical Therapy    Physical Therapy Evaluation    Patient Name: Sofia Foreman  MRN: [id number]  Department:   Room: [facility]  Today's Date: 3/14/2025   Time Calculation  Start Time: 1240  Stop Time: 1250  Time Calculation (min): 10 min    Assessment/Plan   PT Assessment  Rehab Prognosis: Good  Barriers to Discharge Home: No anticipated barriers  Evaluation/Treatment Tolerance: Patient tolerated treatment well  Medical Staff Made Aware: Yes  Strengths: Ability to acquire knowledge, Insight into problems, Physical health, Premorbid level of function, Support and attitude of living partners  End of Session Communication: Bedside nurse  Assessment Comment: Pt is POD #0  s/p L4-5 microdiscectomy with spinal precautions and WBAT. Pt demonstrating independence with all mobility including gait and stairs with no device. No acute PT needs identified and no anticipated needs at discharge. Evaluation only completed  End of Session Patient Position:  (seated EOB with family/caregiver present)  IP OR SWING BED PT PLAN  Inpatient or Swing Bed: Inpatient  PT Plan  PT Plan: PT Eval only  PT Eval Only Reason: No acute PT needs identified  PT Frequency: PT eval only  PT Discharge Recommendations: Low intensity level of continued care  Equipment Recommended upon Discharge:  (no needs)  PT Recommended Transfer Status: Independent  PT - OK to Discharge: Yes    Subjective   General Visit Information:  General  Reason for Referral: Pt is a 35 yo female POD #0 s/pL4-5 microdiscectomy  Referred By: Dr. King  Past Medical History Relevant to Rehab:   Past Medical History:   Diagnosis Date    Anemia     iron deficiency    Anxiety     GERD (gastroesophageal reflux disease)     under control    HL (hearing loss)     bilateral 35% loss, multiple tympanoplastys    Migraines        Family/Caregiver Present: Yes  Caregiver Feedback: family present and receptive  Prior to Session Communication: Bedside nurse  Patient Position Received:   (seated EOB with family/caregiver present)  General Comment: Pt cleared for PT evaluation by primary RN. Pt pleasant and agreeable to PT evaluation  Home Living:  Home Living  Type of Home: House  Lives With: Spouse (and children)  Home Adaptive Equipment: Walker rolling or standard  Home Layout: Two level, Stairs to alternate level with rails  Alternate Level Stairs-Rails:  (unilateral)  Alternate Level Stairs-Number of Steps: 12  Home Access: Stairs to enter with rails  Entrance Stairs-Rails:  (unilateral)  Entrance Stairs-Number of Steps: 3  Prior Level of Function:  Prior Function Per Pt/Caregiver Report  Level of [place]: Independent with ADLs and functional transfers, Independent with homemaking with ambulation  ADL Assistance: Independent  Homemaking Assistance: Independent  Ambulatory Assistance: Independent (no device (only used FWW about a week prior to surgery due to noted need for furniture walking for balance due to increased LLE weakness))  Vocational: Full time employment (RN)  Prior Function Comments: (-) fall. (+) driving  Precautions:  Precautions  Medical Precautions: Fall precautions  Post-Surgical Precautions: Spinal precautions (for comfort)  Precautions Comment: log roll advised for comfort             Objective   Pain:  Pain Assessment  Pain Assessment: 0-10  0-10 (Numeric) Pain Score: 4  Pain Type: Surgical pain  Pain Location: Back  Pain Orientation: Lower  Pain Interventions: Cold applied, Repositioned, Ambulation/increased activity  Cognition:  Cognition  Overall Cognitive Status: Within Functional Limits  Orientation Level: Oriented X4  Attention: Within Functional Limits  Memory: Within Funtional Limits  Insight: Within function limits  Impulsive: Within functional limits    General Assessments:  Activity Tolerance  Endurance: Tolerates 30 min exercise with multiple rests    Sensation  Light Touch: No apparent deficits    Coordination  Movements are Fluid and Coordinated:  "Yes    Static Sitting Balance  Static Sitting-Balance Support: No upper extremity supported  Static Sitting-Level of Assistance: Modified independent  Static Sitting-Comment/Number of Minutes: 2 min    Static Standing Balance  Static Standing-Balance Support: No upper extremity supported  Static Standing-Level of Assistance: Modified independent  Static Standing-Comment/Number of Minutes: 2 min  Functional Assessments:  Bed Mobility  Bed Mobility: No    Transfers  Transfer: Yes  Transfer 1  Transfer From 1: Stand to  Transfer to 1: Sit  Technique 1: Sit to stand, Stand to sit  Transfer Device 1: Gait belt  Transfer Level of Assistance 1: Modified independent    Ambulation/Gait Training  Ambulation/Gait Training Performed: Yes  Ambulation/Gait Training 1  Surface 1: Level tile  Gait Support Devices: Gait belt  Assistance 1: Modified independent  Quality of Gait 1:  (mildly reduced gait jannet)  Comments/Distance (ft) 1: 200ft 2x    Stairs  Stairs: Yes  Stairs  Rails 1: Left  Device 1: No device  Support Devices 1: Gait belt  Assistance 1: Modified independent  Comment/Number of Steps 1: x13 steps (7\") - ascent and descent. Reciprocal pattern  Extremity/Trunk Assessments:    WFL bilaterally     Outcome Measures:  [facility] Basic Mobility  Turning from your back to your side while in a flat bed without using bedrails: None  Moving from lying on your back to sitting on the side of a flat bed without using bedrails: None  Moving to and from bed to chair (including a wheelchair): None  Standing up from a chair using your arms (e.g. wheelchair or bedside chair): None  To walk in hospital room: None  Climbing 3-5 steps with railing: None  Basic Mobility - Total Score: 24        Education Documentation  Precautions, taught by Quan Lucero, PT at 3/14/2025  3:00 PM.  Learner: Patient  Readiness: Acceptance  Method: Explanation, Demonstration, Handout  Response: Verbalizes Understanding    Body Mechanics, taught by Quan " Benton Lucero, PT at 3/14/2025  3:00 PM.  Learner: Patient  Readiness: Acceptance  Method: Explanation, Demonstration, Handout  Response: Verbalizes Understanding    Mobility Training, taught by Quan Lucero, PT at 3/14/2025  3:00 PM.  Learner: Patient  Readiness: Acceptance  Method: Explanation, Demonstration, Handout  Response: Verbalizes Understanding    Precautions, taught by Quan Lucero, PT at 3/14/2025  2:59 PM.  Learner: Patient  Readiness: Acceptance  Method: Explanation  Response: Verbalizes Understanding, Needs Reinforcement    Body Mechanics, taught by Quan Lucero, PT at 3/14/2025  2:59 PM.  Learner: Patient  Readiness: Acceptance  Method: Explanation  Response: Verbalizes Understanding, Needs Reinforcement    Mobility Training, taught by Quan Lucero, PT at 3/14/2025  2:59 PM.  Learner: Patient  Readiness: Acceptance  Method: Explanation  Response: Verbalizes Understanding, Needs Reinforcement    Education Comments  No comments found.

## 2025-03-14 NOTE — ANESTHESIA POSTPROCEDURE EVALUATION
Patient: Sofia Foreman    Procedure Summary       Date: 03/14/25 Room / Location: [facility] OR 07 / [facility] OR    Anesthesia Start: 0730 Anesthesia Stop: 0900    Procedure: L4-L5 Lumbar Spine Microdiscectomy (Spine Lumbar) Diagnosis:       Other intervertebral disc displacement, lumbar region      (Other intervertebral disc displacement, lumbar region [M51.26])    Surgeons: Jeffrey King MD Responsible Provider: Tor Rodrigez MD    Anesthesia Type: general ASA Status: 2            Anesthesia Type: general    Vitals Value Taken Time   /71 03/14/25 0931   Temp 37.3 °C (99.1 °F) 03/14/25 0856   Pulse 95 03/14/25 0937   Resp 14 03/14/25 0915   SpO2 100 % 03/14/25 0937   Vitals shown include unfiled device data.    Anesthesia Post Evaluation    Patient location during evaluation: PACU  Patient participation: complete - patient participated  Level of consciousness: awake  Pain management: adequate  Airway patency: patent  Cardiovascular status: acceptable  Respiratory status: acceptable  Hydration status: acceptable  Postoperative Nausea and Vomiting: none    There were no known notable events for this encounter.

## 2025-03-14 NOTE — H&P
History Of Present Illness  Sofia Foreman is a 34 y.o. female presenting with severe lumbar radiculopathy.     Past Medical History  She has a past medical history of Anemia, Anxiety, GERD (gastroesophageal reflux disease), HL (hearing loss), and Migraines.    Surgical History  She has a past surgical history that includes Tympanoplasty; Ankle fracture surgery (Left); and Hardware Removal (Left, 10/05/2022).     Social History  She reports that she has never smoked. She has never used smokeless tobacco. She reports current alcohol use. She reports that she does not use drugs.    Family History  Family History   Problem Relation Name Age of Onset    No Known Problems Mother      No Known Problems Father          Allergies  Penicillins and Mushroom    Review of Systems     Physical Exam     Last Recorded Vitals  Blood pressure 119/73, pulse 50, temperature 36.4 °C (97.5 °F), temperature source Temporal, resp. rate 16, SpO2 99%.    Relevant Results      Scheduled medications    Continuous medications    PRN medications    Results for orders placed or performed during the hospital encounter of 03/14/25 (from the past 24 hours)   POCT pregnancy, urine   Result Value Ref Range    Preg Test, Ur Negative Negative       Assessment/Plan   Assessment & Plan  Other intervertebral disc displacement, lumbar region              I spent   minutes in the professional and overall care of this patient.      Jeffrey King MD

## 2025-03-14 NOTE — DISCHARGE INSTR - OTHER ORDERS
Discharge Instructions for Lumbar Procedures:        Limit your activity to primarily just walking.    Avoid any excessive bending, twisting and lifting no more than 10 pounds.      Please avoid any anti-inflammatories such as Advil, Aleve, ibuprofen, Motrin. You will be given instructions on when you may resume anti-inflammatories at your follow up appointment.       Please do not take any vitamins until your follow-up appointment with Dr. King.      Please do not drive for 4 weeks or until cleared by Dr King    - You are allowed to be a passenger in the car and can travel short distances      You may shower 3/20/2025 (6 days after surgery date)    - Until then, you can sponge bath.    - Please keep your incision as dry and clean as possible.      We will write for your pain medication up to 6 weeks postop.    - These will be sent electronically for you.    - Please call Dr. King's office ([phone]) when you need a refill.    - Please call 24 hours in advance of your last pill running out.      You may change your surgical dressing in 48 hours following discharge.    - You can apply a regular bandaid or gauze to it and change it daily until the drainage stops.    - Please keep the incision dry and clean.    - The Steri-Strips will fall off on their own.    - Once there is no more discharge from the incisions than you may keep it open to air.        Please do not hesitate to reach out to Dr. King with any postop questions at [phone].    You may also call the nurse navigators with post-op questions Jennifer Seay RN (at [phone]) or Pat Mcclendon RN (at [phone].    As part of the  [employer] enhanced benefit, you will receive a total of 5 home care visits. This will include one nursing visit, three physical therapy visits and one occupational therapy visit. Please reach out to your nurse navigator if you have any additional questions regarding this enhanced benefit.

## 2025-03-14 NOTE — HH CARE COORDINATION
Home Care received a Referral for Nursing, Physical Therapy, and Occupational Therapy. We have processed the referral for a Start of Care on 3/15/25.     If you have any questions or concerns, please feel free to contact us at [phone]. Follow the prompts, enter your five digit zip code, and you will be directed to your care team on E[facility].

## 2025-03-14 NOTE — DISCHARGE INSTRUCTIONS
No driving for 4 weeks  Avoid excessive bending and lifting; No lifting > 10 pounds  May change dressing in 48 hours and replace with dry dressing  May shower on Thursday March 20; remove dressing prior to bathing  Call Dr. King with any concerns [phone]  Dr. King's office to call to confirm follow-up

## 2025-03-14 NOTE — ANESTHESIA PREPROCEDURE EVALUATION
Patient: Sofia Foreman    Procedure Information       Date/Time: 03/14/25 0730    Procedure: L4-L5 Lumbar Spine Microdiscectomy (Spine Lumbar)    Location: [facility] OR 07 / [facility] OR    Surgeons: Jeffrey King MD            Relevant Problems   Musculoskeletal   (+) Other intervertebral disc displacement, lumbar region       Clinical information reviewed:   Tobacco  Allergies  Meds   Med Hx  Surg Hx   Fam Hx  Soc Hx         Past Medical History:   Diagnosis Date   • Anemia     iron deficiency   • Anxiety    • GERD (gastroesophageal reflux disease)     under control   • HL (hearing loss)     bilateral 35% loss, multiple tympanoplastys   • Migraines       Past Surgical History:   Procedure Laterality Date   • ANKLE FRACTURE SURGERY Left    • HARDWARE REMOVAL Left 10/05/2022   • TYMPANOPLASTY      multiple     Social History     Tobacco Use   • Smoking status: Never   • Smokeless tobacco: Never   Vaping Use   • Vaping status: Never Used   Substance Use Topics   • Alcohol use: Yes     Comment: 2-4/month   • Drug use: Never      Current Outpatient Medications   Medication Instructions   • aspirin-acetaminophen-caffeine (Excedrin Extra Strength) 250-250-65 mg tablet 1 tablet, Every 6 hours PRN   • baclofen (LIORESAL) 10 mg, oral, 2 times daily   • buPROPion SR (WELLBUTRIN SR) 200 mg, oral, Daily   • chlorhexidine (Peridex) 0.12 % solution Use one capful (15ml) the night before and morning of surgery. Expectorate, do not swallow.   • clonazePAM (KLONOPIN) 1 mg, oral, Daily PRN   • cyclobenzaprine (FLEXERIL) 10 mg, oral, 3 times daily PRN   • gabapentin (NEURONTIN) 300 mg, oral, Daily   • lidocaine 4 % patch 1 patch, transdermal, Daily, Remove & discard patch within 12 hours or as directed by MD.   • meloxicam (MOBIC) 15 mg, oral, Daily   • Mounjaro 5 mg, subcutaneous, Every 7 days   • multivitamin tablet 1 tablet, Daily   • norethindrone-e.estradioL-iron (Loestrin 24 FE) 1 mg-20 mcg (24)/75 mg (4) tablet  "Take 1 tablet by mouth once daily. Take continuously (skip placebos)   • omeprazole OTC (PRILOSEC OTC) 20 mg, Daily RT   • oxyCODONE (ROXICODONE) 5 mg, Every 4 hours PRN   • sertraline (Zoloft) 100 mg tablet Take 1 and 1/2 tablets by mouth daily   • topiramate (Topamax) 25 mg tablet Take 1 tablet by mouth every morning and 2 tablets by mouth every evening   • traZODone (DESYREL) 100 mg, oral, Nightly      Allergies   Allergen Reactions   • Penicillins Anaphylaxis   • Mushroom Rash        Chemistry    Lab Results   Component Value Date/Time     03/04/2025 1511    K 4.4 03/04/2025 1511     (H) 03/04/2025 1511    CO2 24 03/04/2025 1511    BUN 17 03/04/2025 1511    CREATININE 0.86 03/04/2025 1511    Lab Results   Component Value Date/Time    CALCIUM 9.3 03/04/2025 1511    ALKPHOS 112 (H) 12/06/2018 0914    AST 21 12/06/2018 0914    ALT 10 12/06/2018 0914    BILITOT 0.5 12/06/2018 0914          Lab Results   Component Value Date    HGBA1C 4.8 03/04/2025     Lab Results   Component Value Date/Time    WBC 8.8 03/04/2025 1511    HGB 14.8 03/04/2025 1511    HCT 45.0 03/04/2025 1511     03/04/2025 1511     Lab Results   Component Value Date/Time    PROTIME 14.4 (H) 03/04/2025 1511    INR 1.3 (H) 03/04/2025 1511     No results found for: \"ABORH\"  No results found for this or any previous visit (from the past 4464 hours).  No results found for this or any previous visit from the past 1095 days.       Visit Vitals  OB Status Having periods   Smoking Status Never     NPO/Void Status  Carbohydrate Drink Given Prior to Surgery? : N  Date of Last Liquid: 03/14/25  Time of Last Liquid: 0230  Date of Last Solid: 03/13/25  Time of Last Solid: 1800  Last Intake Type: Clear fluids  Time of Last Void: 0619        Physical Exam    Airway  Mallampati: II  TM distance: >3 FB  Neck ROM: full     Cardiovascular - normal exam     Dental - normal exam     Pulmonary - normal exam     Abdominal - normal exam  (+) obese   "       Anesthesia Plan    History of general anesthesia?: yes  History of complications of general anesthesia?: no    ASA 2     general     The patient is not a current smoker.    intravenous induction   Postoperative administration of opioids is intended.  Anesthetic plan and risks discussed with patient.  Use of blood products discussed with patient who.    Plan discussed with CAA and attending.

## 2025-03-16 ENCOUNTER — HOME CARE VISIT (OUTPATIENT)
Dept: HOME HEALTH SERVICES | Facility: HOME HEALTH | Age: 35
End: 2025-03-16
Payer: COMMERCIAL

## 2025-03-16 PROCEDURE — G0299 HHS/HOSPICE OF RN EA 15 MIN: HCPCS

## 2025-03-17 ENCOUNTER — DOCUMENTATION (OUTPATIENT)
Dept: ORTHOPEDIC SURGERY | Facility: HOSPITAL | Age: 35
End: 2025-03-17
Payer: COMMERCIAL

## 2025-03-17 ENCOUNTER — HOME CARE VISIT (OUTPATIENT)
Dept: HOME HEALTH SERVICES | Facility: HOME HEALTH | Age: 35
End: 2025-03-17
Payer: COMMERCIAL

## 2025-03-17 VITALS
HEART RATE: 80 BPM | DIASTOLIC BLOOD PRESSURE: 56 MMHG | RESPIRATION RATE: 16 BRPM | TEMPERATURE: 99.3 F | HEIGHT: 64 IN | BODY MASS INDEX: 29.88 KG/M2 | WEIGHT: 175 LBS | SYSTOLIC BLOOD PRESSURE: 90 MMHG

## 2025-03-17 VITALS — HEART RATE: 94 BPM | RESPIRATION RATE: 16 BRPM | TEMPERATURE: 97.6 F

## 2025-03-17 PROCEDURE — G0151 HHCP-SERV OF PT,EA 15 MIN: HCPCS

## 2025-03-17 SDOH — HEALTH STABILITY: PHYSICAL HEALTH
EXERCISE COMMENTS: SITTING LAQ, HIP FLEXION, ANKLE PUMPS RIGHT X 10 REPS  SUPINE QUAD AND GLUT SETS, ANKLE PUMPS, RIGHT HEEL SLIDES X 10  STANDING HEEL RAISES, HAMSTRING CURLS X 10 REPS

## 2025-03-17 SDOH — HEALTH STABILITY: PHYSICAL HEALTH: EXERCISE TYPE: SEE COMMENTS BELOW

## 2025-03-17 ASSESSMENT — ACTIVITIES OF DAILY LIVING (ADL)
GROOMING_CURRENT_FUNCTION: SUPERVISION
LAUNDRY: DEPENDENT
DRESSING_LB_CURRENT_FUNCTION: MODERATE ASSIST
TRANSPORTATION: DEPENDENT
TELEPHONE USE ASSESSED: 1
AMBULATION ASSISTANCE: 1
ENTERING_EXITING_HOME: STAND BY ASSIST
FEEDING ASSESSED: 1
AMBULATION ASSISTANCE: STAND BY ASSIST
OASIS_M1830: 05
CURRENT_FUNCTION: STAND BY ASSIST
AMBULATION ASSISTANCE ON FLAT SURFACES: 1
LIGHT HOUSEKEEPING: DEPENDENT
TRANSPORTATION ASSESSED: 1
SHOPPING ASSESSED: 1
HOUSEKEEPING ASSESSED: 1
PREPARING MEALS: DEPENDENT
PHYSICAL TRANSFERS ASSESSED: 1
AMBULATION_DISTANCE/DURATION_TOLERATED: 100
LAUNDRY ASSESSED: 1
AMBULATION ASSISTANCE: 1
TOILETING: MINIMUM ASSIST
CURRENT_FUNCTION: STAND BY ASSIST
FEEDING: SUPERVISION
DRESSING_UB_CURRENT_FUNCTION: MINIMUM ASSIST
PHYSICAL TRANSFERS ASSESSED: 1
ORAL_CARE_CURRENT_FUNCTION: NEEDS ASSISTANCE
USING THE TELPHONE: INDEPENDENT
GROOMING ASSESSED: 1
TOILETING: 1
SHOPPING: DEPENDENT
AMBULATION ASSISTANCE: STAND BY ASSIST
ORAL_CARE_ASSESSED: 1

## 2025-03-17 ASSESSMENT — PAIN SCALES - PAIN ASSESSMENT IN ADVANCED DEMENTIA (PAINAD)
CONSOLABILITY: 0
BODYLANGUAGE: 0 - RELAXED.
NEGVOCALIZATION: 0 - NONE.
CONSOLABILITY: 0 - NO NEED TO CONSOLE.
BODYLANGUAGE: 0
NEGVOCALIZATION: 0
TOTALSCORE: 1
FACIALEXPRESSION: 1
FACIALEXPRESSION: 1 - SAD. FRIGHTENED. FROWN.
BREATHING: 0

## 2025-03-17 ASSESSMENT — ENCOUNTER SYMPTOMS
PAIN: 1
APPETITE LEVEL: POOR
LOWEST PAIN SEVERITY IN PAST 24 HOURS: 4/10
PAIN LOCATION - PAIN SEVERITY: 4/10
PAIN: 1
PERSON REPORTING PAIN: PATIENT
PAIN LOCATION - PAIN QUALITY: SHARP
FATIGUE: 1
FATIGUES EASILY: 1
MUSCLE WEAKNESS: 1
DEPRESSION: 0
LOWEST PAIN SEVERITY IN PAST 24 HOURS: 4/10
PAIN LOCATION - PAIN SEVERITY: 5/10
PAIN LOCATION - EXACERBATING FACTORS: MOVEMENT
SUBJECTIVE PAIN PROGRESSION: UNCHANGED
OCCASIONAL FEELINGS OF UNSTEADINESS: 0
SUBJECTIVE PAIN PROGRESSION: WAXING AND WANING
PAIN LOCATION - RELIEVING FACTORS: REST
PAIN SEVERITY GOAL: 0/10
LAST BOWEL MOVEMENT: 67273
HIGHEST PAIN SEVERITY IN PAST 24 HOURS: 8/10
HIGHEST PAIN SEVERITY IN PAST 24 HOURS: 5/10
PAIN SEVERITY GOAL: 0/10
CHANGE IN APPETITE: DECREASED
PAIN LOCATION - PAIN DURATION: CONSTANT
PAIN LOCATION: BACK
PAIN LOCATION - PAIN FREQUENCY: CONSTANT
PAIN LOCATION: BACK
MUSCLE WEAKNESS: 1
LOSS OF SENSATION IN FEET: 0

## 2025-03-17 ASSESSMENT — BALANCE ASSESSMENTS
NUDGED SCORE: 2
ARISING SCORE: 1
BALANCE SCORE: 13
SITTING BALANCE: 1 - STEADY, SAFE
STANDING BALANCE: 1 - STEADY BUT WIDE STANCE AND USES CANE OR OTHER SUPPORT
ARISES: 1 - ABLE, USES ARMS TO HELP
IMMEDIATE STANDING BALANCE FIRST 5 SECONDS: 2 - STEADY WITHOUT WALKER OR OTHER SUPPORT
ATTEMPTS TO ARISE: 2 - ABLE TO RISE, ONE ATTEMPT
SITTING DOWN: 1 - USES ARMS OR NOT SMOOTH MOTION
EYES CLOSED AT MAXIMUM POSITION NUDGED: 1 - STEADY
NUDGED: 2 - STEADY
TURNING 360 DEGREES STEPS: 1 - CONTINUOUS STEPS

## 2025-03-17 ASSESSMENT — GAIT ASSESSMENTS
INITIATION OF GAIT IMMEDIATELY AFTER GO: 1 - NO HESITANCY
WALKING STANCE: 0 - HEELS APART
PATH: 1 - MILD/MODERATE DEVIATION OR USES WALKING AID
TRUNK: 1 - NO SWAY BUT FLEXION OF KNEES OR BACK OR SPREADS ARMS WHILE WALKING
GAIT SCORE: 9
BALANCE AND GAIT SCORE: 22
STEP CONTINUITY: 1 - STEPS APPEAR CONTINUOUS
TRUNK SCORE: 1
PATH SCORE: 1
STEP SYMMETRY: 1 - RIGHT AND LEFT STEP LENGTH APPEAR EQUAL

## 2025-03-17 ASSESSMENT — PAIN SCALES - GENERAL: PAINLEVEL_OUTOF10: 7

## 2025-03-17 NOTE — HOME HEALTH
- Trop 0.128>> 0.093, EKG w/ non-specific TWAs  - no reported CP  - suspect type 2 demand ischemia from vol overload and uncontrolled HTN  - trend Tn   With this SOC visit, patient presented alert and oriented x 3 and was pleasant and cooperative. Patient sat on couch in living room for assessment. The skill of a nurse is required for aftercare following spinal surgery. The skill of therapy is required for strengthening, gait training, and safety teaching during ADL's. Discussed DC from nursing when goals are met. Patient verbalized understanding.

## 2025-03-17 NOTE — PROGRESS NOTES
I spoke with Sofia.  She is doing well.  Her severe preoperative radicular pain is completely resolved.  The preoperative numbness is also substantially improved.    She is having surgical low back pain.  She is taking 1 Percocet every 6 hours.  She is using the Robaxin 3 times a day.  It only helps minimally.    I have encouraged her to increase the pain medication to 1 or 2 Percocet every 4 hours as needed and increase the Robaxin to every 6 hours as needed.    She has good insight about this.    She will keep me updated on her progress.    If she needs a new prescription for postoperative analgesia she will contact me.    ** Dictated with voice recognition software and not immediately reviewed for errors in grammar and/or spelling **

## 2025-03-18 ENCOUNTER — HOME CARE VISIT (OUTPATIENT)
Dept: HOME HEALTH SERVICES | Facility: HOME HEALTH | Age: 35
End: 2025-03-18
Payer: COMMERCIAL

## 2025-03-18 VITALS — SYSTOLIC BLOOD PRESSURE: 85 MMHG | TEMPERATURE: 97.2 F | HEART RATE: 81 BPM | DIASTOLIC BLOOD PRESSURE: 55 MMHG

## 2025-03-18 PROCEDURE — G0152 HHCP-SERV OF OT,EA 15 MIN: HCPCS

## 2025-03-18 ASSESSMENT — ENCOUNTER SYMPTOMS
PAIN LOCATION: BACK
PAIN LOCATION - PAIN SEVERITY: 5/10
PAIN LOCATION - PAIN DURATION: ACUTE
PAIN LOCATION - RELIEVING FACTORS: REST
PAIN LOCATION - PAIN FREQUENCY: FREQUENT
PERSON REPORTING PAIN: PATIENT
PAIN LOCATION - EXACERBATING FACTORS: ACTIVITY
PAIN LOCATION - PAIN QUALITY: ACHING
PAIN: 1

## 2025-03-18 ASSESSMENT — ACTIVITIES OF DAILY LIVING (ADL)
ADLS_COMMENTS: GENERALLY DRY BY DAY, BUT NOT AT NIGHT AND NEEDS SOME ASSISTANCE WITH THE DEVICES.   8    GENERALLY DRY BY DAY AND NIGHT, MAY HAVE OCCAS ACCIDENT OR NEED MIN ASSIST WITH INTERNAL OR EXTERNAL DEVICES.   10   THE PATIENT IS ABLE TO CONTROL BLADDER DAY
ADLS_COMMENTS: AGEMENT.   0    DEPENDENT IN WHEELCHAIR AMBULATION.   1    PATIENT CAN PROPEL SELF SHORT DISTANCES ON FLAT SURFACE, BUT ASSISTANCE IS REQUIRED FOR ALL OTHER STEPS OF WHEELCHAIR MANAGEMENT.  3    PRESENCE OF ONE PERSON IS NECESSARY AND CONSTANT ASSIST
ADLS_COMMENTS: AND NIGHT, AND/OR IS INDEPENDENT WITH INTERNAL OR EXTERNAL DEVICES.     BATHING   0    TOTAL DEPENDENCE IN BATHING SELF.   1    ASSISTANCE IS REQUIRED IN ALL ASPECTS OF BATHING, BUT PATIENT IS ABLE TO MAKE SOME CONTRIBUTION.   3    ASSISTANCE IS REQ
ADLS_COMMENTS: ES, CANES, OR A WALKARETTE, AND WALK 50 METRES WITHOUT HELP OR SUPERVISION.     AMBULATION/WHEELCHAIR * (IF UNABLE TO WALK) ONLY USE THIS ITEM IF THE PATIENT IS RATED “0” FOR AMBULATION, AND THEN ONLY IF THE PATIENT HAS BEEN TRAINED IN WHEELCHAIR MAN
ADLS_COMMENTS: NG   0    THE PATIENT IS UNABLE TO CLIMB STAIRS.   2    ASSISTANCE IS REQUIRED IN ALL ASPECTS OF CHAIR CLIMBING, INCLUDING ASSISTANCE WITH WALKING AIDS.   5    THE PATIENT IS ABLE TO ASCEND/DESCEND BUT IS UNABLE TO CARRY WALKING AIDS AND NEEDS SUPERV
ADLS_COMMENTS: CHAIR/BED TRANSFERS  0    UNABLE TO PARTICIPATE IN A TRANSFER. TWO ATTENDANTS ARE REQUIRED TO TRANSFER THE PATIENT WITH OR WITHOUT A MECHANICAL DEVICE.   3    ABLE TO PARTICIPATE BUT MAXIMUM ASSISTANCE OF ONE OTHER PERSON IS REQUIRE IN ALL ASPECTS OF
ADLS_COMMENTS: WHERE PATIENTS MOVE FROM DEPENDENCY TO ASSISTED INDEPENDENCE.   60 - 80    IF LIVING ALONE WILL PROBABLY NEED A NUMBER OF COMMUNITY SERVICES TO COPE.   **MORE THAN 85     LIKELY TO BE DISCHARGED TO COMMUNITY LIVING - INDEPENDENT IN TRANSFERS AND ABL
ADLS_COMMENTS: MILK/SUGAR INTO TEA, SALT, PEPPER, SPREADING BUTTER, TURNING A PLATE OR OTHER “SET UP” ACTIVITIES.   8    INDEP WITH PREPARED TRAY, MAY NEED MEAT CUT, MILK CARTON/JAR LID OPENED. ANOTHER PERSON IS NOT REQUIRED  10   THE PATIENT CAN FEED SELF FROM A
WASHING_LB_CURRENT_FUNCTION: MINIMUM ASSIST
ADLS_COMMENTS: UIRED WITH EITHER TRANSFER TO SHOWER/BATH OR WITH WASHING OR DRYING; INCLUDING INABILITY TO COMPLETE A TASK BECAUSE OF CONDITION OR DISEASE, ETC.   4    SUPERVISION IS REQUIRED FOR SAFETY IN ADJUSTING THE WATER TEMPERATURE, OR IN THE TRANSFER.   5
ADLS_COMMENTS: ET PAPER WITHOUT HELP. IF NECESSARY, THE PATIENT MAY USE A BED PAN OR COMMODE OR URINAL AT NIGHT, BUT MUST BE ABLE TO EMPTY IT AND CLEAN IT.     BOWEL CONTROL   0   THE PATIENT IS BOWEL INCONTINENT.   2   THE PATIENT NEEDS HELP TO ASSUME APPROPRIATE
ADLS_COMMENTS: NCE OR SAFETY IN HAZARDOUS SITUATIONS.   15   THE PATIENT MUST BE ABLE TO WEAR BRACES IF REQUIRED, LOCK AND UNLOCK THESE BRACES ASSUME STANDING POSITION, SIT DOWN, AND PLACE THE NECESSARY AIDS INTO POSITION FOR USE. THE PATIENT MUST BE ABLE TO CRUTCH
ADLS_COMMENTS: POSITION, AND WITH BOWEL MOVEMENT FACILITATORY TECHNIQUES.   5   THE PATIENT CAN ASSUME APPROPRIATE POSITION, BUT CANNOT USE FACILITATORY TECHNIQUES OR CLEAN SELF WITHOUT ASSISTANCE AND HAS FREQUENT ACCIDENTS.   8    ASSISTANCE IS REQUIRED WITH INCON
ADLS_COMMENTS: THE TRANSFER.   8    THE TRANSFER REQUIRES THE ASSISTANCE OF ONE OTHER PERSON. ASSISTANCE MAY BE REQUIRED IN ANY ASPECT OF THE TRANSFER.   12  THE PRESENCE OF ANOTHER PERSON IS REQUIRED EITHER AS A CONFIDENCE MEASURE, OR TO PROVIDE SUPERVISION FOR S
WASHING_HAIR_CURRENT_FUNCTION: INDEPENDENT
ADLS_COMMENTS: RIC NEEDS TO BE ADMINISTERED.   2    CAN MANIPULATE AN EATING DEVICE, USUALLY A SPOON, BUT SOMEONE MUST PROVIDE ACTIVE ASSISTANCE DURING THE MEAL.   5    ABLE TO FEED SELF WITH SUPERVISION. ASSISTANCE IS REQUIRED WITH ASSOCIATED TASKS SUCH AS PUTTING
ADLS_COMMENTS: INIMAL ASSISTANCE IS REQUIRED WITH FASTENING CLOTHING SUCH AS BUTTONS, ZIPS, BRA, SHOES, ETC.   10   THE PATIENT IS ABLE TO PUT ON, REMOVE, CORSET, BRACES, AS PRESCRIBED.     PERSONAL HYGIENE (GROOMING)   0    THE PATIENT IS UNABLE TO ATTEND TO PERSO
ADLS_COMMENTS: AFETY.   15  THE PATIENT CAN SAFELY APPROACH THE BED WALKING OR IN A WHEELCHAIR, LOCK BRAKES, LIFT FOOTRESTS, OR POSITION WALKING AID, MOVE SAFELY TO BED, LIE DOWN, COME TO A SITTING POSITION ON THE SIDE OF THE BED, CHANGE THE POSITION OF THE WHEELCH
ADLS_COMMENTS: EMENT OF CLOTHING, TRANSFERRING, OR WASHING HANDS.   8    SUP MAY BE REQUIRED FOR SAFETY WITH NORMAL TOILET. BSC MAY BE USED AT NIGHT, ASSIST FOR EMPTYING AND CLEANING.   10   THE PATIENT IS ABLE TO GET ON/OFF THE TOILET, FASTEN CLOTHING AND USE TOIL
ADLS_COMMENTS: OF DRESSING AND IS UNABLE TO PARTICIPATE IN THE ACTIVITY.   2     THE PATIENT IS ABLE TO PARTICIPATE TO SOME DEGREE, BUT IS DEPENDENT IN ALL ASPECTS OF DRESSING.   5     ASSISTANCE IS NEEDED IN PUTTING ON, AND/OR REMOVING ANY CLOTHING.   8     ONLY M
ADLS_COMMENTS: NAL HYGIENE AND IS DEPENDENT IN ALL ASPECTS.   1    ASSISTANCE IS REQUIRED IN ALL STEPS OF PERSONAL HYGIENE, BUT PATIENT ABLE TO MAKE SOME CONTRIBUTION.   3    SOME ASSISTANCE IS REQUIRED IN ONE OR MORE STEPS OF PERSONAL HYGIENE.   4    PATIENT IS AB
ADLS_COMMENTS: EVERE DEPENDENCE     21 - 60  MODERATE DEPENDENCE     61 - 90  **SLIGHT DEPENDENCE      91 - 99  INDEPENDENCE        100    SCORE PREDICTION    LESS THAN 40    UNLIKELY TO GO HOME - DEPENDENT IN MOBILITY - DEPENDENT IN SELF CARE   60    PIVOTAL SCORE
GROOMING_WITHIN_DEFINED_LIMITS: 1
FEEDING_WITHIN_DEFINED_LIMITS: 1
ADLS_COMMENTS: TINENCE AIDS SUCH AS PAD, ETC. THE PATIENT MAY REQUIRE SUPERVISION WITH THE USE OF SUPPOSITORY OR ENEMA AND HAS OCCASIONAL ACCIDENTS.   10   THE PATIENT CAN CONTROL BOWELS AND HAS NO ACCIDENTS, CAN USE SUPPOSITORY, OR TAKE AN ENEMA WHEN NECESSARY.
ADLS_COMMENTS: 5    TO PROPEL WHEELCHAIR INDEPENDENTLY, THE PATIENT MUST BE ABLE TO GO AROUND CORNERS, TURN AROUND, MANOEUVRE THE CHAIR TO A TABLE, BED, TOILET, ETC. THE PATIENT MUST BE ABLE TO PUSH A CHAIR AT LEAST 50 METRES AND NEGOTIATE KERB.       STAIR CLIMBI
ADLS_COMMENTS: N.   8    ASSISTANCE IS REQUIRED WITH REACHING AIDS AND/OR THEIR MANIPULATION. ONE PERSON IS REQUIRED TO OFFER ASSISTANCE.   12   THE PATIENT IS INDEPENDENT IN AMBULATION BUT UNABLE TO WALK 50 METRES WITHOUT HELP, OR SUPERVISION IS NEEDED FOR CONFIDE
ADLS_COMMENTS: PLUG IN THE RAZOR WITHOUT HELP, AS WELL AS RETRIEVE IT FROM THE DRAWER OR CABINET. A FEMALE PATIENT MUST APPLY HER OWN MAKE-UP, IF USED, BUT NEED NOT BRAID OR STYLE HER HAIR.     FEEDING    0    DEPENDENT IN ALL ASPECTS AND NEEDS TO BE FED, NASOGAST
ADLS_COMMENTS: E TO WALK OR USE WHEELCHAIR INDEPENDENTLY.
ADLS_COMMENTS: BLADDER CONTROL   0    THE PATIENT IS DEPENDENT IN BLADDER MANAGEMENT, IS INCONTINENT, OR HAS INDWELLING CATHETER.   2    THE PATIENT IS INCONTINENT BUT IS ABLE TO ASSIST WITH THE APPLICATION OF AN INTERNAL OR EXTERNAL DEVICE.   5    THE PATIENT IS
WASHING_UPB_CURRENT_FUNCTION: MINIMUM ASSIST
ADLS_COMMENTS: LE TO CONDUCT OWN PERSONAL HYGIENE BUT REQUIRES MIN ASSIST BEFORE AND/OR AFTER THE OPERATION.   5    THE PATIENT CAN WASH HIS/HER HANDS AND FACE, COMB HAIR, CLEAN TEETH AND SHAVE. A MALE PATIENT MAY USE ANY KIND OF RAZOR BUT MUST INSERT THE BLADE, OR
ADLS_COMMENTS: THE PATIENT MAY USE A BATHTUB, A SHOWER, OR TAKE A COMPLETE SPONGE BATH. THE PATIENT MUST BE ABLE TO DO ALL THE STEPS OF WHICHEVER METHOD IS EMPLOYED WITHOUT ANOTHER PERSON BEING PRESENT.     DRESSING   0     THE PATIENT IS DEPENDENT IN ALL ASPECTS
ADLS_COMMENTS: ISION AND ASSISTANCE.   8    GENERALLY NO ASSISTANCE IS REQUIRED. AT TIMES SUP IS REQUIRED FOR SAFETY DUE TO MORNING STIFFNESS, SOB, ETC  10   THE PATIENT IS ABLE TO GO UP/DOWN A FLIGHT OF STAIRS SAFELY WITHOUT HELP OR SUPERVISION,USE HAND RAILS, CAN
ADLS_COMMENTS: AIR, TRANSFER BACK INTO IT SAFELY AND/OR GRASP AID AND STAND. THE PATIENT MUST BE INDEPENDENT IN ALL PHASES OF THIS ACTIVITY.     AMBULATION    0    DEPENDENT IN AMBULATION  3    CONSTANT PRESENCE OF ONE OR MORE ASSISTANT IS REQUIRED DURING AMBULATIO
ADLS_COMMENTS: ANCE IS REQUIRED TO MANIPULATE CHAIR TO TABLE, BED, ETC.   4    THE PATIENT CAN PROPEL SELF FOR A REASONABLE DURATION OVER REGULARLY ENCOUNTERED TERRAIN. MINIMAL ASSISTANCE MAY STILL BE REQUIRED IN “TIGHT CORNERS” OR TO NEGOTIATE A KERB 100MM HIGH.

## 2025-03-19 DIAGNOSIS — M51.26 HERNIATED LUMBAR DISC WITHOUT MYELOPATHY: Primary | ICD-10-CM

## 2025-03-19 RX ORDER — OXYCODONE AND ACETAMINOPHEN 5; 325 MG/1; MG/1
2 TABLET ORAL EVERY 4 HOURS PRN
Qty: 42 TABLET | Refills: 0 | Status: SHIPPED | OUTPATIENT
Start: 2025-03-19 | End: 2025-03-26

## 2025-03-20 ENCOUNTER — HOME CARE VISIT (OUTPATIENT)
Dept: HOME HEALTH SERVICES | Facility: HOME HEALTH | Age: 35
End: 2025-03-20
Payer: COMMERCIAL

## 2025-03-20 VITALS
SYSTOLIC BLOOD PRESSURE: 92 MMHG | TEMPERATURE: 97.4 F | DIASTOLIC BLOOD PRESSURE: 72 MMHG | RESPIRATION RATE: 16 BRPM | HEART RATE: 91 BPM

## 2025-03-20 PROCEDURE — G0151 HHCP-SERV OF PT,EA 15 MIN: HCPCS

## 2025-03-20 SDOH — HEALTH STABILITY: PHYSICAL HEALTH
EXERCISE COMMENTS: STANDING HEEL RAISES, HIP ABD, HAMSTRING CURLS, MARCHING, MINI SQAUTS X 15 REPS  SITTING LAQ, HIP FLEXION X 15  STEP UPS X 10 REPS

## 2025-03-20 SDOH — HEALTH STABILITY: PHYSICAL HEALTH: EXERCISE TYPE: SEE COMMENTS BELOW

## 2025-03-20 ASSESSMENT — GAIT ASSESSMENTS
PATH: 2 - STRAIGHT WITHOUT WALKING AID
BALANCE AND GAIT SCORE: 28
STEP SYMMETRY: 1 - RIGHT AND LEFT STEP LENGTH APPEAR EQUAL
WALKING STANCE: 1 - HEELS ALMOST TOUCHING WHILE WALKING
GAIT SCORE: 12
PATH SCORE: 2
STEP CONTINUITY: 1 - STEPS APPEAR CONTINUOUS
TRUNK: 2 - NO SWAY, NO FLEXION, NO USE OF ARMS, NO WALKING AID
TRUNK SCORE: 2
INITIATION OF GAIT IMMEDIATELY AFTER GO: 1 - NO HESITANCY

## 2025-03-20 ASSESSMENT — BALANCE ASSESSMENTS
IMMEDIATE STANDING BALANCE FIRST 5 SECONDS: 2 - STEADY WITHOUT WALKER OR OTHER SUPPORT
SITTING BALANCE: 1 - STEADY, SAFE
ARISING SCORE: 2
SITTING DOWN: 2 - SAFE, SMOOTH MOTION
ATTEMPTS TO ARISE: 2 - ABLE TO RISE, ONE ATTEMPT
ARISES: 2 - ABLE WITHOUT USING ARMS
NUDGED: 2 - STEADY
TURNING 360 DEGREES STEPS: 1 - CONTINUOUS STEPS
EYES CLOSED AT MAXIMUM POSITION NUDGED: 1 - STEADY
NUDGED SCORE: 2
STANDING BALANCE: 2 - NARROW STANCE WITHOUT SUPPORT
BALANCE SCORE: 16

## 2025-03-20 ASSESSMENT — ENCOUNTER SYMPTOMS
MUSCLE WEAKNESS: 1
PAIN: 1
PAIN LOCATION - PAIN SEVERITY: 2/10
PAIN LOCATION - RELIEVING FACTORS: REST, MEDS, ICE
PAIN LOCATION - PAIN DURATION: VARIES
PAIN LOCATION - PAIN QUALITY: ACHES
LOWEST PAIN SEVERITY IN PAST 24 HOURS: 2/10
PAIN LOCATION: BACK
PAIN LOCATION - EXACERBATING FACTORS: MVT, STANDING
PAIN SEVERITY GOAL: 0/10
PAIN LOCATION - PAIN FREQUENCY: INTERMITTENT
HIGHEST PAIN SEVERITY IN PAST 24 HOURS: 4/10
PERSON REPORTING PAIN: PATIENT
SUBJECTIVE PAIN PROGRESSION: GRADUALLY IMPROVING

## 2025-03-20 ASSESSMENT — ACTIVITIES OF DAILY LIVING (ADL)
AMBULATION ASSISTANCE: INDEPENDENT
HOME_HEALTH_OASIS: 01
AMBULATION ASSISTANCE: 1
AMBULATION_DISTANCE/DURATION_TOLERATED: 200
PHYSICAL TRANSFERS ASSESSED: 1
AMBULATION ASSISTANCE ON FLAT SURFACES: 1
CURRENT_FUNCTION: INDEPENDENT
OASIS_M1830: 01

## 2025-04-12 PROCEDURE — RXMED WILLOW AMBULATORY MEDICATION CHARGE

## 2025-04-14 ENCOUNTER — APPOINTMENT (OUTPATIENT)
Dept: ORTHOPEDIC SURGERY | Facility: CLINIC | Age: 35
End: 2025-04-14
Payer: COMMERCIAL

## 2025-04-14 DIAGNOSIS — M54.16 LUMBAR RADICULOPATHY: ICD-10-CM

## 2025-04-14 PROCEDURE — RXMED WILLOW AMBULATORY MEDICATION CHARGE

## 2025-04-14 PROCEDURE — 99024 POSTOP FOLLOW-UP VISIT: CPT | Performed by: ORTHOPAEDIC SURGERY

## 2025-04-14 NOTE — PROGRESS NOTES
Sofia returns 4 weeks from surgery.  She has done extremely well with complete relief of her severe sciatica.    Incision healed.  Strength intact.    Excellent early result.  She will continue a home exercise and therapy program.  Anticipate return to work on May 12.  We will see her in follow-up prior to that return to work date.

## 2025-04-18 ENCOUNTER — EVALUATION (OUTPATIENT)
Dept: PHYSICAL THERAPY | Facility: CLINIC | Age: 35
End: 2025-04-18
Payer: COMMERCIAL

## 2025-04-18 ENCOUNTER — PHARMACY VISIT (OUTPATIENT)
Dept: PHARMACY | Facility: CLINIC | Age: 35
End: 2025-04-18
Payer: COMMERCIAL

## 2025-04-18 DIAGNOSIS — M54.9 POSTOPERATIVE BACK PAIN: Primary | ICD-10-CM

## 2025-04-18 DIAGNOSIS — M54.16 LUMBAR RADICULOPATHY: ICD-10-CM

## 2025-04-18 DIAGNOSIS — G89.18 POSTOPERATIVE BACK PAIN: Primary | ICD-10-CM

## 2025-04-18 DIAGNOSIS — R29.898 RIGHT LEG WEAKNESS: ICD-10-CM

## 2025-04-18 DIAGNOSIS — M25.69 DECREASED ROM OF TRUNK AND BACK: ICD-10-CM

## 2025-04-18 PROCEDURE — 97110 THERAPEUTIC EXERCISES: CPT | Mod: GP

## 2025-04-18 PROCEDURE — 97161 PT EVAL LOW COMPLEX 20 MIN: CPT | Mod: GP

## 2025-04-18 ASSESSMENT — ENCOUNTER SYMPTOMS
OCCASIONAL FEELINGS OF UNSTEADINESS: 0
DEPRESSION: 0
LOSS OF SENSATION IN FEET: 0

## 2025-04-18 ASSESSMENT — PAIN - FUNCTIONAL ASSESSMENT: PAIN_FUNCTIONAL_ASSESSMENT: 0-10

## 2025-04-18 ASSESSMENT — PAIN SCALES - GENERAL: PAINLEVEL_OUTOF10: 3

## 2025-04-18 NOTE — PROGRESS NOTES
Physical Therapy    Physical Therapy Evaluation    Patient Name: Sofia Foreman  MRN: [id number]  Today's Date: 4/18/2025  Name and date of birth 1990 were confirmed at the start of today's session.     Time Entry:  Time Calculation  Start Time: 0940  Stop Time: 1025  Time Calculation (min): 45 min  PT Evaluation Time Entry  PT Evaluation (Low) Time Entry: 30  PT Therapeutic Procedures Time Entry  Therapeutic Exercise Time Entry: 15                   Assessment  PT Assessment Results: Decreased strength, Decreased range of motion, Pain, Decreased mobility  Rehab Prognosis: Excellent  Assessment Comment: Sofia presents with expected deficits following her lumbar microsdiscectomy 1 month ago. She will benefit from skilled PT to address her deficits, learn self management strategies and work towards consistent pain relief; improved mobility and strength for return to her PLOF at home and work.    Plan  Treatment/Interventions: Cryotherapy, Education/ Instruction, Hot pack, Manual therapy, Therapeutic activities, Therapeutic exercises  PT Plan: Skilled PT  PT Frequency: 2 times per week  Duration: 4-6 weeks; pending progress  Onset Date: 03/14/25  Number of Treatments Authorized: 30 visits/ year; pending auth  Rehab Potential: Excellent  Plan of Care Agreement: Patient    Current Problem  1. Postoperative back pain  Follow Up In Physical Therapy      2. Lumbar radiculopathy  Referral to Physical Therapy    Follow Up In Physical Therapy      3. Decreased ROM of trunk and back  Follow Up In Physical Therapy      4. Right leg weakness  Follow Up In Physical Therapy          Subjective   General:  General  Reason for Referral: s/p L4-5 microdiscectomy  Referred By: Dr Jeffrey King  Past Medical History Relevant to Rehab: reviewed in epic and on rehab intake form  General Comment: Sfoia comes to PT 1 month following a lumbar microdiscectomy to address her right radicular symptoms/ weakness and pain began in Jan.   Since her surgery, she does not have any sharp leg pain; she does feels tight/ stiff; and the right leg aches.  She does feel the surgery is a success.  She is still getting some mm spasms in her right low back. After her surgery, she went home same day; had a couple HH visits.  Precautions:  Precautions  STEADI Fall Risk Score (The score of 4 or more indicates an increased risk of falling): 0  Precautions Comment: N olifting more than 10 lbs.  Pain:  Pain Assessment: 0-10  0-10 (Numeric) Pain Score: 3 (standing/ walking increases right LBP to 7-8/10)  Pain Type: Surgical pain  Pain Location: Back  Pain Orientation: Lower  Pain Radiating Towards: no radiating pain at this time  Home Living:  Home Living Comment: Reviewed and no concerns. Two story; no difficulty on stairs.  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 Non tender.    Lumbar AROM  Lumbar flexion: (60°): mod limited; pulls in low back  Lumbar extension (25°): max limited; and increased right SI pain  Lumbar sidebend right (25°): min limited; pulls on left  Lumbar sidebend left (25°): min limited; pulls on right side    Lumbar Myotomes  R Hip Flex : 4  L Hip Flex (L2): (5/5): 5  R Knee Ext (L3): (5/5): 4+  L Knee Ext (L3): (5/5) : 5  R Ankle DF (L4): (5/5): 4  L Ankle DF (L4): (5/5): 5  R Ankle EV (S1): (5/5): 4  L Ankle EV (S1): (5/5): 5  Specific Lower Extremity MMT  R Gluteals (prone): (5/5): 4  L Gluteals (prone): (5/5): 5  R Hip External Rotation: (5/5): 4  L Hip External Rotation: (5/5): 4+      Flexibility:   Mod tight right  hamstrings on right; min tight hamstrings on left  Mod tight hip flexors bilaterally    Special Tests  Slump: (Negative): mild tension produced on right low back     Gait:  Non antalgic. Reciprocal stair climbing.    Outcome Measures:  Other Measures  Oswestry Disablity Index (RILEY): 12 (raw score)     OP EDUCATION:  Individual(s) Educated: Patient  Education Provided: Anatomy, Body Mechanics, POC, Home Exercise Program, Post-Op Precautions  Risk and Benefits Discussed with Patient/Caregiver/Other: yes  Patient/Caregiver Demonstrated Understanding: yes  Plan of Care Discussed and Agreed Upon: yes  Patient Response to Education: Patient/Caregiver Verbalized Understanding of Information, Patient/Caregiver Performed Return Demonstration of Exercises/Activities, Patient/Caregiver Asked Appropriate Questions  Education Comment: HEP handout issued  Access Code: [email or web address]  URL: [email or web address]  Date: 04/18/2025  Prepared by: Rajni Prescott    Exercises  - Hooklying Single Knee to Chest Stretch  - 2-3 x daily - 7 x weekly - 3 reps - 20 sec hold  - Supine Lower Trunk Rotation  - 2-3 x daily - 7 x weekly - 10 reps - 5 sec hold  - Supine Bridge  - 2 x daily - 7 x weekly - 10 reps  - Supine 90/90 Sciatic Nerve Glide with Knee Flexion/Extension  - 2 x daily - 7 x weekly - 30 reps  - Seated Hamstring Stretch  - 2 x daily - 7 x weekly - 3 reps - 20 sec hold  - Lying Prone  - 3 x daily - 7 x weekly - 2-3 min hold    Today's Treatment:  15 min  SKTC 3 x 20 sec  Gentle LTR 5 x 10 sec R/L  Supine 90/90 sciatic n glide x30  Bridging 10 x 5 sec  Seated hamstring stretch 3 x 20 sec  Prone lying education for symptom management  Reviewed general posture/ body mechanics for ADLs    Goals:  Patient Goals: Improved back pain and ROM and to be able to return to her prior work duties.    Physical Therapy Goals:   Pain: Will be independent with symptom management strategies and report low back pain no worse than 2/10  with return to PLOF at home and work.   ROM: Lumbar AROM WNL and painfree all planes for painfree transfers/ bed mobility; use of proper posture/ body mechanics and return to PLOF at home and work.  Strength: Lower quarter strength at least 4+/5 where deficient; exhibits at least 4/5 Sahrmann abdominal strength; demonstrating good TA engagement/ bracing; and trunk mm endurance with neutral lumbar stabilization ex for ability to use proper posture and body mechanics with ADLs, IADLs, work activities.  Posture/ Body Mechanics: Will demonstrate and consistently use proper posture and body mechanics for symptom management with all daily and work activities.  Function: At least 5 point improvement on RILEY, and returned to PLOF at home and work. Will be independent and compliant with appropriate HEP for carryover of PT to meet all goals.

## 2025-04-22 ENCOUNTER — TREATMENT (OUTPATIENT)
Dept: PHYSICAL THERAPY | Facility: CLINIC | Age: 35
End: 2025-04-22
Payer: COMMERCIAL

## 2025-04-22 DIAGNOSIS — M25.69 DECREASED ROM OF TRUNK AND BACK: ICD-10-CM

## 2025-04-22 DIAGNOSIS — M54.9 POSTOPERATIVE BACK PAIN: Primary | ICD-10-CM

## 2025-04-22 DIAGNOSIS — G89.18 POSTOPERATIVE BACK PAIN: Primary | ICD-10-CM

## 2025-04-22 DIAGNOSIS — R29.898 RIGHT LEG WEAKNESS: ICD-10-CM

## 2025-04-22 DIAGNOSIS — M54.16 LUMBAR RADICULOPATHY: ICD-10-CM

## 2025-04-22 PROCEDURE — 97110 THERAPEUTIC EXERCISES: CPT | Mod: GP,CQ

## 2025-04-22 ASSESSMENT — PAIN DESCRIPTION - DESCRIPTORS: DESCRIPTORS: ACHING;SORE;TIGHTNESS

## 2025-04-22 ASSESSMENT — PAIN - FUNCTIONAL ASSESSMENT: PAIN_FUNCTIONAL_ASSESSMENT: 0-10

## 2025-04-22 ASSESSMENT — PAIN SCALES - GENERAL: PAINLEVEL_OUTOF10: 3

## 2025-04-22 NOTE — PROGRESS NOTES
Physical Therapy    Physical Therapy Treatment    Patient Name: Sofia Foreman  MRN: [id number]  :1990  Today's Date: 2025    Time Entry:   Time Calculation  Start Time: 1015  Stop Time: 1105  Time Calculation (min): 50 min  PT Therapeutic Procedures Time Entry  Therapeutic Exercise Time Entry: 48          Visit:  2  Visit limit: 12  2025-2025    Assessment:   Patient performed added exercises without complaints of increased lumbar or lower extremity symptoms. Patient presented with improved hamstring flexibility measures since last recorded measurements. Patient indicates and demonstrates compliance with home exercise program for improving trunk flexibility.     Plan:   Continue with trunk and lower extremity flexibility, stabilization and strengthening exercises progressing as tolerated to prepare patient for eventual return to work.     Patient RTD 2025.    Current Problem  1. Postoperative back pain  Follow Up In Physical Therapy      2. Decreased ROM of trunk and back  Follow Up In Physical Therapy      3. Right leg weakness  Follow Up In Physical Therapy      4. Lumbar radiculopathy  Follow Up In Physical Therapy          Subjective    Patient reports continues to experience lumbar and right lower extremity tightness with daily activities.     Precautions  Precautions  Precautions Comment: No lifting more than 10 lbs.    Pain  Pain Assessment  Pain Assessment: 0-10  0-10 (Numeric) Pain Score: 3  Pain Type: Surgical pain  Pain Location: Back  Pain Orientation: Lower  Pain Descriptors: Aching, Sore, Tightness    Objective   Added exercises. See exercise log for specifics.   Issued blue theraband for lateral walking    Seated hamstring flexibility  Right = 2 inches from toes  Left = toes      Outcome Measures:  Other Measures  Oswestry Disablity Index (RILEY): 12 (Score at initial evaluation)      Treatments:  EXERCISES Date  2025 Date Date Date    REPS REPS REPS REPS   Seated  "hamstring flexibility 5\"H x 10 each             Airdyne S2  6 minutes             Pulldowns Black x 20      Skis  Black x 20      Rows Black x 20       Dynamic Stab w/wgt  10\"H 5# x 10             Multi hip flexion 40# x 15      Multi hip abduction 40# x 15      Multi hip adduction 40# x 15      Multi hip extension 40# x 15             Band walks 100' Blue x 1 each             Abdominal curls 50# 3 x 10             Back extension  70# 2 x 15  80# 1 x 15             Bilateral hamstring curls 40# 3 x 12      Bilateral knee extension 30# 3 x 12                                                              HEP Reviewed            OP EDUCATION:       Goals:  Patient Goals: Improved back pain and ROM and to be able to return to her prior work duties.    Physical Therapy Goals:   Pain: Will be independent with symptom management strategies and report low back pain no worse than 2/10 with return to PLOF at home and work.   ROM: Lumbar AROM WNL and painfree all planes for painfree transfers/ bed mobility; use of proper posture/ body mechanics and return to PLOF at home and work.  Strength: Lower quarter strength at least 4+/5 where deficient; exhibits at least 4/5 Sahrmann abdominal strength; demonstrating good TA engagement/ bracing; and trunk mm endurance with neutral lumbar stabilization ex for ability to use proper posture and body mechanics with ADLs, IADLs, work activities.  Posture/ Body Mechanics: Will demonstrate and consistently use proper posture and body mechanics for symptom management with all daily and work activities.  Function: At least 5 point improvement on RILEY, and returned to PLOF at home and work. Will be independent and compliant with appropriate HEP for carryover of PT to meet all goals.   "

## 2025-04-25 ENCOUNTER — DOCUMENTATION (OUTPATIENT)
Dept: PHYSICAL THERAPY | Facility: CLINIC | Age: 35
End: 2025-04-25
Payer: COMMERCIAL

## 2025-04-25 ENCOUNTER — APPOINTMENT (OUTPATIENT)
Dept: PHYSICAL THERAPY | Facility: CLINIC | Age: 35
End: 2025-04-25
Payer: COMMERCIAL

## 2025-04-25 DIAGNOSIS — G89.18 POSTOPERATIVE BACK PAIN: Primary | ICD-10-CM

## 2025-04-25 DIAGNOSIS — M25.69 DECREASED ROM OF TRUNK AND BACK: ICD-10-CM

## 2025-04-25 DIAGNOSIS — M54.9 POSTOPERATIVE BACK PAIN: Primary | ICD-10-CM

## 2025-04-25 DIAGNOSIS — R29.898 RIGHT LEG WEAKNESS: ICD-10-CM

## 2025-04-25 NOTE — PROGRESS NOTES
Physical Therapy                 Therapy Communication Note    Patient Name: Sofia Foreman  MRN: [id number]  Department:   Room: Room/bed info not found  Today's Date: 4/25/2025     Discipline: Physical Therapy          Missed Visit Reason:      Missed Time: Cancel    Comment: Cancelled via My chart

## 2025-04-29 ENCOUNTER — TREATMENT (OUTPATIENT)
Dept: PHYSICAL THERAPY | Facility: CLINIC | Age: 35
End: 2025-04-29
Payer: COMMERCIAL

## 2025-04-29 ENCOUNTER — APPOINTMENT (OUTPATIENT)
Dept: PHYSICAL THERAPY | Facility: HOSPITAL | Age: 35
End: 2025-04-29
Payer: COMMERCIAL

## 2025-04-29 DIAGNOSIS — M54.16 LUMBAR RADICULOPATHY: ICD-10-CM

## 2025-04-29 DIAGNOSIS — M54.9 POSTOPERATIVE BACK PAIN: Primary | ICD-10-CM

## 2025-04-29 DIAGNOSIS — R29.898 RIGHT LEG WEAKNESS: ICD-10-CM

## 2025-04-29 DIAGNOSIS — M25.69 DECREASED ROM OF TRUNK AND BACK: ICD-10-CM

## 2025-04-29 DIAGNOSIS — G89.18 POSTOPERATIVE BACK PAIN: Primary | ICD-10-CM

## 2025-04-29 PROCEDURE — 97110 THERAPEUTIC EXERCISES: CPT | Mod: GP,CQ

## 2025-04-29 ASSESSMENT — PAIN - FUNCTIONAL ASSESSMENT: PAIN_FUNCTIONAL_ASSESSMENT: 0-10

## 2025-04-29 ASSESSMENT — PAIN SCALES - GENERAL: PAINLEVEL_OUTOF10: 2

## 2025-04-29 ASSESSMENT — PAIN DESCRIPTION - DESCRIPTORS: DESCRIPTORS: TIGHTNESS;SORE;ACHING

## 2025-04-29 NOTE — PROGRESS NOTES
Physical Therapy    Physical Therapy Treatment    Patient Name: Sofia Foreman  MRN: [id number]  :1990  Today's Date: 2025    Time Entry:   Time Calculation  Start Time: 1615  Stop Time: 1705  Time Calculation (min): 50 min  PT Therapeutic Procedures Time Entry  Therapeutic Exercise Time Entry: 48          Visit:  3  Visit limit: 12  2025-2025    Assessment:   Patient performed added exercises without complaints of increased lumbar or lower extremity symptoms. Patient presented with improved lumbar extension range of motion and hamstring flexibility measures since last recorded measurements (goal #2 addressed). Patient indicates and demonstrates compliance with abdominal bracing techniques to assist in decreasing lumbar symptoms with bending and lifting activities.       Plan:   Continue with trunk and lower extremity flexibility, stabilization and strengthening exercises progressing as tolerated to prepare patient for eventual return to work.     Patient RTD 2025.    Current Problem  1. Postoperative back pain  Follow Up In Physical Therapy      2. Decreased ROM of trunk and back  Follow Up In Physical Therapy      3. Right leg weakness  Follow Up In Physical Therapy      4. Lumbar radiculopathy  Follow Up In Physical Therapy          Subjective    Patient reports no complaints of increased low back pain after previous treatment. Patient reports muscles were sore however no complaints of lumbar symptoms.     Precautions  Precautions  Precautions Comment: No lifting more than 10 lbs.    Pain  Pain Assessment  Pain Assessment: 0-10  0-10 (Numeric) Pain Score: 2  Pain Type: Surgical pain  Pain Location: Back  Pain Orientation: Lower  Pain Descriptors: Tightness, Sore, Aching    Objective     AROM lumbar  Extension = 5 degrees    Seated hamstring flexibility  Right = 1 inch from toes  Left = toes      Outcome Measures:         Treatments:  EXERCISES Date  2025 Date  2025 Date Date  "   REPS REPS REPS REPS   Seated hamstring flexibility 5\"H x 10 each             Airdyne S2  6 minutes S2  2 minutes            Pulldowns Black x 20 Black x 20     Skis  Black x 20 Black x 20     Rows Black x 20  Black x 20     Dynamic Stab w/wgt  10\"H 5# x 10 8# x 10            Multi hip flexion 40# x 15 50# x 20     Multi hip abduction 40# x 15 50# x 20     Multi hip adduction 40# x 15 50# x 20     Multi hip extension 40# x 15 50# x 20            Band walks 100' Blue x 1 each             Abdominal curls 50# 3 x 10 50# 3 x 15            Back extension  70# 2 x 15  80# 1 x 15 90# 3 x 15            Bilateral hamstring curls 40# 3 x 12 50# 3 x 15     Bilateral knee extension 30# 3 x 12 30# 3 x 15            Shuttle DLP  6B 3 x 15     Shuttle DTR  6B 3 x 15     Shuttle SLP w/iso abs  5B 3 x 15            Hoist palloff pulls  5# 5\"H x 10 each     Hoist PNF high  20# x 15 each            HEP Reviewed            OP EDUCATION:       Goals:  Patient Goals: Improved back pain and ROM and to be able to return to her prior work duties.    Physical Therapy Goals:   Pain: Will be independent with symptom management strategies and report low back pain no worse than 2/10 with return to PLOF at home and work.   ROM: Lumbar AROM WNL and painfree all planes for painfree transfers/ bed mobility; use of proper posture/ body mechanics and return to PLOF at home and work.  Strength: Lower quarter strength at least 4+/5 where deficient; exhibits at least 4/5 Sahrmann abdominal strength; demonstrating good TA engagement/ bracing; and trunk mm endurance with neutral lumbar stabilization ex for ability to use proper posture and body mechanics with ADLs, IADLs, work activities.  Posture/ Body Mechanics: Will demonstrate and consistently use proper posture and body mechanics for symptom management with all daily and work activities.  Function: At least 5 point improvement on RILEY, and returned to PLOF at home and work. Will be independent and " compliant with appropriate HEP for carryover of PT to meet all goals.

## 2025-05-01 ENCOUNTER — APPOINTMENT (OUTPATIENT)
Dept: PHYSICAL THERAPY | Facility: CLINIC | Age: 35
End: 2025-05-01
Payer: COMMERCIAL

## 2025-05-01 ENCOUNTER — DOCUMENTATION (OUTPATIENT)
Dept: PHYSICAL THERAPY | Facility: CLINIC | Age: 35
End: 2025-05-01
Payer: COMMERCIAL

## 2025-05-01 DIAGNOSIS — R29.898 RIGHT LEG WEAKNESS: ICD-10-CM

## 2025-05-01 DIAGNOSIS — M25.69 DECREASED ROM OF TRUNK AND BACK: ICD-10-CM

## 2025-05-01 DIAGNOSIS — G89.18 POSTOPERATIVE BACK PAIN: Primary | ICD-10-CM

## 2025-05-01 DIAGNOSIS — M54.9 POSTOPERATIVE BACK PAIN: Primary | ICD-10-CM

## 2025-05-01 NOTE — PROGRESS NOTES
Physical Therapy                 Therapy Communication Note    Patient Name: Sofia Foreman  MRN: [id number]  Department:   Room: Room/bed info not found  Today's Date: 5/1/2025     Discipline: Physical Therapy          Missed Visit Reason:  Patient cancelled via MyChart    Missed Time: Cancel    Comment:

## 2025-05-05 ENCOUNTER — DOCUMENTATION (OUTPATIENT)
Dept: PHYSICAL THERAPY | Facility: CLINIC | Age: 35
End: 2025-05-05
Payer: COMMERCIAL

## 2025-05-05 ENCOUNTER — APPOINTMENT (OUTPATIENT)
Dept: PHYSICAL THERAPY | Facility: CLINIC | Age: 35
End: 2025-05-05
Payer: COMMERCIAL

## 2025-05-05 DIAGNOSIS — G89.18 POSTOPERATIVE BACK PAIN: Primary | ICD-10-CM

## 2025-05-05 DIAGNOSIS — M25.69 DECREASED ROM OF TRUNK AND BACK: ICD-10-CM

## 2025-05-05 DIAGNOSIS — R29.898 RIGHT LEG WEAKNESS: ICD-10-CM

## 2025-05-05 DIAGNOSIS — M54.9 POSTOPERATIVE BACK PAIN: Primary | ICD-10-CM

## 2025-05-05 PROCEDURE — RXMED WILLOW AMBULATORY MEDICATION CHARGE

## 2025-05-05 NOTE — PROGRESS NOTES
Physical Therapy                 Therapy Communication Note    Patient Name: Sofia Foreman  MRN: [id number]  Department:   Room: Room/bed info not found  Today's Date: 5/5/2025     Discipline: Physical Therapy          Missed Visit Reason:  Patient cancelled via MyChart.     Missed Time: Cancel    Comment:

## 2025-05-07 ENCOUNTER — APPOINTMENT (OUTPATIENT)
Dept: PHYSICAL THERAPY | Facility: CLINIC | Age: 35
End: 2025-05-07
Payer: COMMERCIAL

## 2025-05-07 ENCOUNTER — DOCUMENTATION (OUTPATIENT)
Dept: PHYSICAL THERAPY | Facility: CLINIC | Age: 35
End: 2025-05-07
Payer: COMMERCIAL

## 2025-05-07 DIAGNOSIS — M25.69 DECREASED ROM OF TRUNK AND BACK: ICD-10-CM

## 2025-05-07 DIAGNOSIS — G89.18 POSTOPERATIVE BACK PAIN: Primary | ICD-10-CM

## 2025-05-07 DIAGNOSIS — M54.9 POSTOPERATIVE BACK PAIN: Primary | ICD-10-CM

## 2025-05-07 DIAGNOSIS — R29.898 RIGHT LEG WEAKNESS: ICD-10-CM

## 2025-05-07 NOTE — PROGRESS NOTES
Physical Therapy                 Therapy Communication Note    Patient Name: Sofia Foreman  MRN: [id number]  Department:   Room: Room/bed info not found  Today's Date: 5/7/2025     Discipline: Physical Therapy          Missed Visit Reason:  Patient cancelled via MyChart.    Missed Time: Cancel    Comment:

## 2025-05-09 ENCOUNTER — PHARMACY VISIT (OUTPATIENT)
Dept: PHARMACY | Facility: CLINIC | Age: 35
End: 2025-05-09
Payer: COMMERCIAL

## 2025-05-12 ENCOUNTER — APPOINTMENT (OUTPATIENT)
Dept: ORTHOPEDIC SURGERY | Facility: CLINIC | Age: 35
End: 2025-05-12
Payer: COMMERCIAL

## 2025-05-12 ENCOUNTER — APPOINTMENT (OUTPATIENT)
Dept: PHYSICAL THERAPY | Facility: CLINIC | Age: 35
End: 2025-05-12
Payer: COMMERCIAL

## 2025-05-12 DIAGNOSIS — M51.26 DISPLACEMENT OF LUMBAR INTERVERTEBRAL DISC WITHOUT MYELOPATHY: Primary | ICD-10-CM

## 2025-05-12 DIAGNOSIS — R29.898 RIGHT LEG WEAKNESS: ICD-10-CM

## 2025-05-12 DIAGNOSIS — G89.18 POSTOPERATIVE BACK PAIN: Primary | ICD-10-CM

## 2025-05-12 DIAGNOSIS — M25.69 DECREASED ROM OF TRUNK AND BACK: ICD-10-CM

## 2025-05-12 DIAGNOSIS — M54.9 POSTOPERATIVE BACK PAIN: Primary | ICD-10-CM

## 2025-05-12 PROCEDURE — 99024 POSTOP FOLLOW-UP VISIT: CPT | Performed by: ORTHOPAEDIC SURGERY

## 2025-05-12 ASSESSMENT — PAIN - FUNCTIONAL ASSESSMENT: PAIN_FUNCTIONAL_ASSESSMENT: NO/DENIES PAIN

## 2025-05-12 NOTE — PROGRESS NOTES
Sofia returns for follow-up and she is doing exceptionally well.  Complete relief of her severe radicular symptoms.    She is completing physical therapy.    She looks great.  Stable gait.  Full extension of her right leg.  Strength intact.    Stable course following microdiscectomy for severe disabling sciatica.    She can return to work May 13 without restriction.    She will keep me updated on her progress.  Follow-up as needed.

## 2025-05-27 PROCEDURE — RXMED WILLOW AMBULATORY MEDICATION CHARGE

## 2025-05-30 ENCOUNTER — PHARMACY VISIT (OUTPATIENT)
Dept: PHARMACY | Facility: CLINIC | Age: 35
End: 2025-05-30
Payer: COMMERCIAL

## 2025-06-15 PROCEDURE — RXMED WILLOW AMBULATORY MEDICATION CHARGE

## 2025-06-17 ENCOUNTER — PHARMACY VISIT (OUTPATIENT)
Dept: PHARMACY | Facility: CLINIC | Age: 35
End: 2025-06-17
Payer: COMMERCIAL

## 2025-06-30 PROCEDURE — RXMED WILLOW AMBULATORY MEDICATION CHARGE

## 2025-07-03 ENCOUNTER — PHARMACY VISIT (OUTPATIENT)
Dept: PHARMACY | Facility: CLINIC | Age: 35
End: 2025-07-03
Payer: COMMERCIAL

## 2025-07-12 PROCEDURE — RXMED WILLOW AMBULATORY MEDICATION CHARGE

## 2025-07-15 ENCOUNTER — PHARMACY VISIT (OUTPATIENT)
Dept: PHARMACY | Facility: CLINIC | Age: 35
End: 2025-07-15
Payer: COMMERCIAL

## 2025-07-15 PROCEDURE — RXMED WILLOW AMBULATORY MEDICATION CHARGE

## 2025-07-15 RX ORDER — NORETHINDRONE ACETATE/ETHINYL ESTRADIOL AND FERROUS FUMARATE 1MG-20(24)
KIT ORAL
Qty: 28 TABLET | Refills: 2 | OUTPATIENT
Start: 2025-07-15

## 2025-07-15 RX ORDER — GABAPENTIN 300 MG/1
CAPSULE ORAL
Qty: 30 CAPSULE | Refills: 2 | OUTPATIENT
Start: 2025-07-15

## 2025-07-15 RX ORDER — CLONAZEPAM 1 MG/1
1 TABLET ORAL DAILY PRN
Qty: 30 TABLET | Refills: 2 | OUTPATIENT
Start: 2025-07-15

## 2025-07-15 RX ORDER — SERTRALINE HYDROCHLORIDE 100 MG/1
TABLET, FILM COATED ORAL
Qty: 30 TABLET | Refills: 2 | OUTPATIENT
Start: 2025-07-15

## 2025-07-15 RX ORDER — TOPIRAMATE 50 MG/1
TABLET, FILM COATED ORAL
Qty: 45 TABLET | Refills: 2 | OUTPATIENT
Start: 2025-07-15

## 2025-07-15 RX ORDER — BUPROPION HYDROCHLORIDE 200 MG/1
TABLET, EXTENDED RELEASE ORAL
Qty: 30 TABLET | Refills: 2 | OUTPATIENT
Start: 2025-07-15

## 2025-07-15 RX ORDER — TRAZODONE HYDROCHLORIDE 100 MG/1
TABLET ORAL
Qty: 30 TABLET | Refills: 2 | OUTPATIENT
Start: 2025-07-15

## 2025-07-23 ENCOUNTER — PHARMACY VISIT (OUTPATIENT)
Dept: PHARMACY | Facility: CLINIC | Age: 35
End: 2025-07-23
Payer: COMMERCIAL

## 2025-07-28 PROCEDURE — RXMED WILLOW AMBULATORY MEDICATION CHARGE

## 2025-07-30 PROCEDURE — RXMED WILLOW AMBULATORY MEDICATION CHARGE

## 2025-08-04 ENCOUNTER — PHARMACY VISIT (OUTPATIENT)
Dept: PHARMACY | Facility: CLINIC | Age: 35
End: 2025-08-04
Payer: COMMERCIAL

## 2025-08-26 ENCOUNTER — APPOINTMENT (OUTPATIENT)
Dept: OBSTETRICS AND GYNECOLOGY | Facility: CLINIC | Age: 35
End: 2025-08-26
Payer: COMMERCIAL

## 2025-08-26 VITALS
DIASTOLIC BLOOD PRESSURE: 75 MMHG | HEIGHT: 64 IN | SYSTOLIC BLOOD PRESSURE: 113 MMHG | WEIGHT: 187.8 LBS | BODY MASS INDEX: 32.06 KG/M2

## 2025-08-26 DIAGNOSIS — Z01.419 ENCOUNTER FOR ANNUAL ROUTINE GYNECOLOGICAL EXAMINATION: Primary | ICD-10-CM

## 2025-08-26 DIAGNOSIS — Z30.41 ORAL CONTRACEPTIVE PILL SURVEILLANCE: ICD-10-CM

## 2025-08-26 PROCEDURE — 3008F BODY MASS INDEX DOCD: CPT | Performed by: STUDENT IN AN ORGANIZED HEALTH CARE EDUCATION/TRAINING PROGRAM

## 2025-08-26 PROCEDURE — 1036F TOBACCO NON-USER: CPT | Performed by: STUDENT IN AN ORGANIZED HEALTH CARE EDUCATION/TRAINING PROGRAM

## 2025-08-26 PROCEDURE — 99385 PREV VISIT NEW AGE 18-39: CPT | Performed by: STUDENT IN AN ORGANIZED HEALTH CARE EDUCATION/TRAINING PROGRAM

## 2025-08-26 RX ORDER — NORETHINDRONE ACETATE/ETHINYL ESTRADIOL AND FERROUS FUMARATE 1MG-20(24)
1 KIT ORAL DAILY
Qty: 84 TABLET | Refills: 3 | Status: SHIPPED | OUTPATIENT
Start: 2025-08-26 | End: 2025-08-26

## 2025-08-26 RX ORDER — NORETHINDRONE ACETATE/ETHINYL ESTRADIOL AND FERROUS FUMARATE 1MG-20(24)
1 KIT ORAL DAILY
Qty: 84 TABLET | Refills: 3 | Status: SHIPPED | OUTPATIENT
Start: 2025-08-26 | End: 2026-08-26

## 2025-08-26 SDOH — ECONOMIC STABILITY: TRANSPORTATION INSECURITY
IN THE PAST 12 MONTHS, HAS THE LACK OF TRANSPORTATION KEPT YOU FROM MEDICAL APPOINTMENTS OR FROM GETTING MEDICATIONS?: NO

## 2025-08-26 SDOH — ECONOMIC STABILITY: TRANSPORTATION INSECURITY
IN THE PAST 12 MONTHS, HAS LACK OF TRANSPORTATION KEPT YOU FROM MEETINGS, WORK, OR FROM GETTING THINGS NEEDED FOR DAILY LIVING?: NO

## 2025-08-26 ASSESSMENT — PATIENT HEALTH QUESTIONNAIRE - PHQ9
1. LITTLE INTEREST OR PLEASURE IN DOING THINGS: NOT AT ALL
2. FEELING DOWN, DEPRESSED OR HOPELESS: NOT AT ALL
SUM OF ALL RESPONSES TO PHQ9 QUESTIONS 1 & 2: 0
1. LITTLE INTEREST OR PLEASURE IN DOING THINGS: NOT AT ALL
2. FEELING DOWN, DEPRESSED OR HOPELESS: NOT AT ALL
SUM OF ALL RESPONSES TO PHQ9 QUESTIONS 1 AND 2: 0

## 2025-08-26 ASSESSMENT — ENCOUNTER SYMPTOMS
OCCASIONAL FEELINGS OF UNSTEADINESS: 0
DEPRESSION: 0
LOSS OF SENSATION IN FEET: 0

## 2025-08-26 ASSESSMENT — LIFESTYLE VARIABLES: HOW OFTEN DO YOU HAVE A DRINK CONTAINING ALCOHOL: NEVER

## 2025-08-26 ASSESSMENT — PAIN SCALES - GENERAL: PAINLEVEL_OUTOF10: 0-NO PAIN

## 2025-08-27 PROCEDURE — RXMED WILLOW AMBULATORY MEDICATION CHARGE

## 2025-08-29 ENCOUNTER — APPOINTMENT (OUTPATIENT)
Dept: OBSTETRICS AND GYNECOLOGY | Facility: CLINIC | Age: 35
End: 2025-08-29
Payer: COMMERCIAL

## 2025-08-30 ENCOUNTER — PHARMACY VISIT (OUTPATIENT)
Dept: PHARMACY | Facility: CLINIC | Age: 35
End: 2025-08-30
Payer: COMMERCIAL

## 2025-09-24 ENCOUNTER — APPOINTMENT (OUTPATIENT)
Dept: OTOLARYNGOLOGY | Facility: CLINIC | Age: 35
End: 2025-09-24
Payer: COMMERCIAL

## (undated) DEVICE — LEGEND, LUB DIFFUSER PACK

## (undated) DEVICE — TIP, SUCTION, FRAZIER, W/CONTROL VENT, 10 FR

## (undated) DEVICE — SPONGE, HEMOSTATIC, GELATIN, SURGIFOAM, 2 X 6 CM X 7 MM

## (undated) DEVICE — PREP TRAY, VAGINAL

## (undated) DEVICE — GLOVE, SURGICAL, PROTEXIS PI BLUE W/NEUTHERA, 8.5, PF, LF

## (undated) DEVICE — SUTURE, VICRYL PLUS, 4-0, 27 IN, PS-2

## (undated) DEVICE — DRESSING, NON-ADHERENT, OIL EMULSION, CURITY, 3 X 8 IN, STERILE

## (undated) DEVICE — TOWEL, SURGICAL, NEURO, O/R, 16 X 26, BLUE, STERILE

## (undated) DEVICE — SUTURE, ETHILON, 2-0, 18 IN, FS, BLACK, BX/12

## (undated) DEVICE — BANDAGE, GAUZE, CONFORMING, KERLIX, 6 PLY, 4.5 IN X 4.1 YD

## (undated) DEVICE — KIT, PATIENT CARE, JACKSON TABLE W/PRONE-SAFE HEADREST

## (undated) DEVICE — Device

## (undated) DEVICE — SUTURE, VICRYL PLUS, 2-0, 27IN, PSL, UND, BRAIDED

## (undated) DEVICE — SUTURE, VICRYL, 1, 27 IN, CT-1, VIOLET

## (undated) DEVICE — ELECTRODE, ELECTROSURGICAL, BLADE, STANDARD, 2.75 IN

## (undated) DEVICE — ADHESIVE, SKIN, MASTISOL, 2/3 CC VIAL

## (undated) DEVICE — DRAPE, INCISE, ANTIMICROBIAL, IOBAN 2, LARGE, 17 X 23 IN, DISPOSABLE, STERILE

## (undated) DEVICE — DRAPE, SHEET, THREE QUARTER, FAN FOLD, 57 X 77 IN

## (undated) DEVICE — STRIP, SKIN CLOSURE, STERI STRIP, REINFORCED, 0.5 X 4 IN

## (undated) DEVICE — CATHETER, IV, ANGIOCATH, SPECIAL PLACEMENT, 14 G X 3.25 IN, FEP POLYMER

## (undated) DEVICE — DRESSING, GAUZE, SPONGE, KERLIX, SUPER, 6 X 6.75 IN, STERILE 10PK

## (undated) DEVICE — SYRINGE, 35 CC, LUER LOCK, MONOJECT, W/O CAP, LF

## (undated) DEVICE — GLOVE, SURGICAL, PROTEXIS PI MICRO, 8.0, PF, LF

## (undated) DEVICE — TIP, SUCTION, FRAZIER, W/CONTROL VENT, 12 FR